# Patient Record
Sex: MALE | Race: BLACK OR AFRICAN AMERICAN | NOT HISPANIC OR LATINO | Employment: UNEMPLOYED | URBAN - METROPOLITAN AREA
[De-identification: names, ages, dates, MRNs, and addresses within clinical notes are randomized per-mention and may not be internally consistent; named-entity substitution may affect disease eponyms.]

---

## 2021-08-07 ENCOUNTER — HOSPITAL ENCOUNTER (EMERGENCY)
Facility: OTHER | Age: 61
Discharge: HOME | End: 2021-08-08
Attending: EMERGENCY MEDICINE
Payer: MEDICAID

## 2021-08-07 VITALS
OXYGEN SATURATION: 95 % | BODY MASS INDEX: 26.31 KG/M2 | DIASTOLIC BLOOD PRESSURE: 68 MMHG | HEIGHT: 74 IN | HEART RATE: 84 BPM | SYSTOLIC BLOOD PRESSURE: 135 MMHG | RESPIRATION RATE: 18 BRPM | TEMPERATURE: 98 F | WEIGHT: 205 LBS

## 2021-08-07 DIAGNOSIS — F10.920 ALCOHOLIC INTOXICATION WITHOUT COMPLICATION: ICD-10-CM
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
## 2021-08-07 DIAGNOSIS — T14.8XXA ABRASION: ICD-10-CM

## 2021-08-07 LAB — POCT GLUCOSE: 103 MG/DL (ref 70–110)

## 2021-08-07 PROCEDURE — 82962 GLUCOSE BLOOD TEST: CPT

## 2021-08-07 PROCEDURE — 99282 EMERGENCY DEPT VISIT SF MDM: CPT
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
## 2022-10-05 ENCOUNTER — OFFICE VISIT (OUTPATIENT)
Dept: PRIMARY CARE CLINIC | Facility: CLINIC | Age: 62
End: 2022-10-05
Payer: MEDICARE

## 2022-10-05 VITALS
OXYGEN SATURATION: 98 % | SYSTOLIC BLOOD PRESSURE: 162 MMHG | WEIGHT: 207.44 LBS | RESPIRATION RATE: 18 BRPM | TEMPERATURE: 98 F | HEIGHT: 74 IN | BODY MASS INDEX: 26.62 KG/M2 | DIASTOLIC BLOOD PRESSURE: 80 MMHG | HEART RATE: 69 BPM

## 2022-10-05 DIAGNOSIS — Z12.11 COLON CANCER SCREENING: ICD-10-CM

## 2022-10-05 DIAGNOSIS — R53.83 FATIGUE, UNSPECIFIED TYPE: ICD-10-CM

## 2022-10-05 DIAGNOSIS — Z00.00 ANNUAL PHYSICAL EXAM: ICD-10-CM

## 2022-10-05 DIAGNOSIS — Z12.5 PROSTATE CANCER SCREENING: Primary | ICD-10-CM

## 2022-10-05 DIAGNOSIS — E78.5 HYPERLIPIDEMIA, UNSPECIFIED HYPERLIPIDEMIA TYPE: ICD-10-CM

## 2022-10-05 DIAGNOSIS — R73.03 PREDIABETES: ICD-10-CM

## 2022-10-05 DIAGNOSIS — I10 HYPERTENSION, UNSPECIFIED TYPE: ICD-10-CM

## 2022-10-05 PROCEDURE — 3079F DIAST BP 80-89 MM HG: CPT | Mod: CPTII,S$GLB,, | Performed by: NURSE PRACTITIONER

## 2022-10-05 PROCEDURE — 1160F PR REVIEW ALL MEDS BY PRESCRIBER/CLIN PHARMACIST DOCUMENTED: ICD-10-PCS | Mod: CPTII,S$GLB,, | Performed by: NURSE PRACTITIONER

## 2022-10-05 PROCEDURE — 99999 PR PBB SHADOW E&M-EST. PATIENT-LVL IV: ICD-10-PCS | Mod: PBBFAC,,, | Performed by: NURSE PRACTITIONER

## 2022-10-05 PROCEDURE — 3077F PR MOST RECENT SYSTOLIC BLOOD PRESSURE >= 140 MM HG: ICD-10-PCS | Mod: CPTII,S$GLB,, | Performed by: NURSE PRACTITIONER

## 2022-10-05 PROCEDURE — 99999 PR PBB SHADOW E&M-EST. PATIENT-LVL IV: CPT | Mod: PBBFAC,,, | Performed by: NURSE PRACTITIONER

## 2022-10-05 PROCEDURE — 99204 PR OFFICE/OUTPT VISIT, NEW, LEVL IV, 45-59 MIN: ICD-10-PCS | Mod: S$GLB,,, | Performed by: NURSE PRACTITIONER

## 2022-10-05 PROCEDURE — 1160F RVW MEDS BY RX/DR IN RCRD: CPT | Mod: CPTII,S$GLB,, | Performed by: NURSE PRACTITIONER

## 2022-10-05 PROCEDURE — 99204 OFFICE O/P NEW MOD 45 MIN: CPT | Mod: S$GLB,,, | Performed by: NURSE PRACTITIONER

## 2022-10-05 PROCEDURE — 1159F PR MEDICATION LIST DOCUMENTED IN MEDICAL RECORD: ICD-10-PCS | Mod: CPTII,S$GLB,, | Performed by: NURSE PRACTITIONER

## 2022-10-05 PROCEDURE — 1159F MED LIST DOCD IN RCRD: CPT | Mod: CPTII,S$GLB,, | Performed by: NURSE PRACTITIONER

## 2022-10-05 PROCEDURE — 3008F PR BODY MASS INDEX (BMI) DOCUMENTED: ICD-10-PCS | Mod: CPTII,S$GLB,, | Performed by: NURSE PRACTITIONER

## 2022-10-05 PROCEDURE — 99499 UNLISTED E&M SERVICE: CPT | Mod: S$GLB,,, | Performed by: NURSE PRACTITIONER

## 2022-10-05 PROCEDURE — 99499 RISK ADDL DX/OHS AUDIT: ICD-10-PCS | Mod: S$GLB,,, | Performed by: NURSE PRACTITIONER

## 2022-10-05 PROCEDURE — 3079F PR MOST RECENT DIASTOLIC BLOOD PRESSURE 80-89 MM HG: ICD-10-PCS | Mod: CPTII,S$GLB,, | Performed by: NURSE PRACTITIONER

## 2022-10-05 PROCEDURE — 3077F SYST BP >= 140 MM HG: CPT | Mod: CPTII,S$GLB,, | Performed by: NURSE PRACTITIONER

## 2022-10-05 PROCEDURE — 3008F BODY MASS INDEX DOCD: CPT | Mod: CPTII,S$GLB,, | Performed by: NURSE PRACTITIONER

## 2022-10-05 RX ORDER — ADALIMUMAB 40MG/0.8ML
KIT SUBCUTANEOUS
COMMUNITY
Start: 2022-04-12 | End: 2023-01-20

## 2022-10-05 RX ORDER — HYDROCHLOROTHIAZIDE 12.5 MG/1
12.5 TABLET ORAL DAILY
Qty: 30 TABLET | Refills: 11 | Status: SHIPPED | OUTPATIENT
Start: 2022-10-05 | End: 2023-01-20

## 2022-10-11 ENCOUNTER — LAB VISIT (OUTPATIENT)
Dept: LAB | Facility: HOSPITAL | Age: 62
End: 2022-10-11
Attending: NURSE PRACTITIONER
Payer: MEDICARE

## 2022-10-11 DIAGNOSIS — Z12.5 PROSTATE CANCER SCREENING: ICD-10-CM

## 2022-10-11 DIAGNOSIS — R53.83 FATIGUE, UNSPECIFIED TYPE: ICD-10-CM

## 2022-10-11 DIAGNOSIS — E78.5 HYPERLIPIDEMIA, UNSPECIFIED HYPERLIPIDEMIA TYPE: ICD-10-CM

## 2022-10-11 DIAGNOSIS — R73.03 PREDIABETES: ICD-10-CM

## 2022-10-11 LAB
ALBUMIN SERPL BCP-MCNC: 4.3 G/DL (ref 3.5–5.2)
ALP SERPL-CCNC: 88 U/L (ref 55–135)
ALT SERPL W/O P-5'-P-CCNC: 24 U/L (ref 10–44)
ANION GAP SERPL CALC-SCNC: 11 MMOL/L (ref 8–16)
AST SERPL-CCNC: 29 U/L (ref 10–40)
BASOPHILS # BLD AUTO: 0.09 K/UL (ref 0–0.2)
BASOPHILS NFR BLD: 1.4 % (ref 0–1.9)
BILIRUB SERPL-MCNC: 1.2 MG/DL (ref 0.1–1)
BUN SERPL-MCNC: 7 MG/DL (ref 6–20)
CALCIUM SERPL-MCNC: 9.6 MG/DL (ref 8.7–10.5)
CHLORIDE SERPL-SCNC: 102 MMOL/L (ref 95–110)
CHOLEST SERPL-MCNC: 155 MG/DL (ref 120–199)
CHOLEST/HDLC SERPL: 4.2 {RATIO} (ref 2–5)
CO2 SERPL-SCNC: 27 MMOL/L (ref 23–29)
COMPLEXED PSA SERPL-MCNC: 1.7 NG/ML (ref 0–4)
CREAT SERPL-MCNC: 0.9 MG/DL (ref 0.5–1.4)
DIFFERENTIAL METHOD: ABNORMAL
EOSINOPHIL # BLD AUTO: 0.1 K/UL (ref 0–0.5)
EOSINOPHIL NFR BLD: 1.8 % (ref 0–8)
ERYTHROCYTE [DISTWIDTH] IN BLOOD BY AUTOMATED COUNT: 13 % (ref 11.5–14.5)
EST. GFR  (NO RACE VARIABLE): >60 ML/MIN/1.73 M^2
ESTIMATED AVG GLUCOSE: 108 MG/DL (ref 68–131)
GLUCOSE SERPL-MCNC: 85 MG/DL (ref 70–110)
HBA1C MFR BLD: 5.4 % (ref 4–5.6)
HCT VFR BLD AUTO: 37.5 % (ref 40–54)
HDLC SERPL-MCNC: 37 MG/DL (ref 40–75)
HDLC SERPL: 23.9 % (ref 20–50)
HGB BLD-MCNC: 12.9 G/DL (ref 14–18)
IMM GRANULOCYTES # BLD AUTO: 0.01 K/UL (ref 0–0.04)
IMM GRANULOCYTES NFR BLD AUTO: 0.2 % (ref 0–0.5)
LDLC SERPL CALC-MCNC: 94.4 MG/DL (ref 63–159)
LYMPHOCYTES # BLD AUTO: 1.7 K/UL (ref 1–4.8)
LYMPHOCYTES NFR BLD: 26.1 % (ref 18–48)
MCH RBC QN AUTO: 30.6 PG (ref 27–31)
MCHC RBC AUTO-ENTMCNC: 34.4 G/DL (ref 32–36)
MCV RBC AUTO: 89 FL (ref 82–98)
MONOCYTES # BLD AUTO: 0.5 K/UL (ref 0.3–1)
MONOCYTES NFR BLD: 8.2 % (ref 4–15)
NEUTROPHILS # BLD AUTO: 4.1 K/UL (ref 1.8–7.7)
NEUTROPHILS NFR BLD: 62.3 % (ref 38–73)
NONHDLC SERPL-MCNC: 118 MG/DL
NRBC BLD-RTO: 0 /100 WBC
PLATELET # BLD AUTO: 227 K/UL (ref 150–450)
PMV BLD AUTO: 10.4 FL (ref 9.2–12.9)
POTASSIUM SERPL-SCNC: 4 MMOL/L (ref 3.5–5.1)
PROT SERPL-MCNC: 8.2 G/DL (ref 6–8.4)
RBC # BLD AUTO: 4.22 M/UL (ref 4.6–6.2)
SODIUM SERPL-SCNC: 140 MMOL/L (ref 136–145)
T4 FREE SERPL-MCNC: 0.99 NG/DL (ref 0.71–1.51)
TRIGL SERPL-MCNC: 118 MG/DL (ref 30–150)
TSH SERPL DL<=0.005 MIU/L-ACNC: 1.06 UIU/ML (ref 0.4–4)
WBC # BLD AUTO: 6.62 K/UL (ref 3.9–12.7)

## 2022-10-11 PROCEDURE — 36415 COLL VENOUS BLD VENIPUNCTURE: CPT | Mod: PN | Performed by: NURSE PRACTITIONER

## 2022-10-11 PROCEDURE — 85025 COMPLETE CBC W/AUTO DIFF WBC: CPT | Performed by: NURSE PRACTITIONER

## 2022-10-11 PROCEDURE — 80053 COMPREHEN METABOLIC PANEL: CPT | Performed by: NURSE PRACTITIONER

## 2022-10-11 PROCEDURE — 80061 LIPID PANEL: CPT | Performed by: NURSE PRACTITIONER

## 2022-10-11 PROCEDURE — 84439 ASSAY OF FREE THYROXINE: CPT | Performed by: NURSE PRACTITIONER

## 2022-10-11 PROCEDURE — 83036 HEMOGLOBIN GLYCOSYLATED A1C: CPT | Performed by: NURSE PRACTITIONER

## 2022-10-11 PROCEDURE — 84443 ASSAY THYROID STIM HORMONE: CPT | Performed by: NURSE PRACTITIONER

## 2022-10-11 PROCEDURE — 84153 ASSAY OF PSA TOTAL: CPT | Performed by: NURSE PRACTITIONER

## 2023-01-20 ENCOUNTER — OFFICE VISIT (OUTPATIENT)
Dept: PRIMARY CARE CLINIC | Facility: CLINIC | Age: 63
End: 2023-01-20
Payer: MEDICARE

## 2023-01-20 VITALS
TEMPERATURE: 98 F | BODY MASS INDEX: 27.73 KG/M2 | SYSTOLIC BLOOD PRESSURE: 148 MMHG | HEART RATE: 76 BPM | HEIGHT: 74 IN | WEIGHT: 216.06 LBS | OXYGEN SATURATION: 98 % | DIASTOLIC BLOOD PRESSURE: 82 MMHG

## 2023-01-20 DIAGNOSIS — Z91.89 FRAMINGHAM CARDIAC RISK >20% IN NEXT 10 YEARS: ICD-10-CM

## 2023-01-20 DIAGNOSIS — D57.3 SICKLE CELL TRAIT: ICD-10-CM

## 2023-01-20 DIAGNOSIS — Z12.11 COLON CANCER SCREENING: ICD-10-CM

## 2023-01-20 DIAGNOSIS — I10 PRIMARY HYPERTENSION: Primary | ICD-10-CM

## 2023-01-20 DIAGNOSIS — F17.200 SMOKER: ICD-10-CM

## 2023-01-20 PROBLEM — Z79.899 DRUG-INDUCED IMMUNODEFICIENCY: Status: RESOLVED | Noted: 2023-01-20 | Resolved: 2023-01-20

## 2023-01-20 PROBLEM — Z79.899 DRUG-INDUCED IMMUNODEFICIENCY: Status: ACTIVE | Noted: 2023-01-20

## 2023-01-20 PROBLEM — F10.920 ALCOHOLIC INTOXICATION WITHOUT COMPLICATION: Status: RESOLVED | Noted: 2023-01-20 | Resolved: 2023-01-20

## 2023-01-20 PROBLEM — D84.821 DRUG-INDUCED IMMUNODEFICIENCY: Status: ACTIVE | Noted: 2023-01-20

## 2023-01-20 PROBLEM — D84.821 DRUG-INDUCED IMMUNODEFICIENCY: Status: RESOLVED | Noted: 2023-01-20 | Resolved: 2023-01-20

## 2023-01-20 PROBLEM — F10.920 ALCOHOLIC INTOXICATION WITHOUT COMPLICATION: Status: ACTIVE | Noted: 2023-01-20

## 2023-01-20 PROCEDURE — 1160F RVW MEDS BY RX/DR IN RCRD: CPT | Mod: CPTII,S$GLB,, | Performed by: FAMILY MEDICINE

## 2023-01-20 PROCEDURE — 99215 OFFICE O/P EST HI 40 MIN: CPT | Mod: S$GLB,,, | Performed by: FAMILY MEDICINE

## 2023-01-20 PROCEDURE — 3077F SYST BP >= 140 MM HG: CPT | Mod: CPTII,S$GLB,, | Performed by: FAMILY MEDICINE

## 2023-01-20 PROCEDURE — 3008F BODY MASS INDEX DOCD: CPT | Mod: CPTII,S$GLB,, | Performed by: FAMILY MEDICINE

## 2023-01-20 PROCEDURE — 3008F PR BODY MASS INDEX (BMI) DOCUMENTED: ICD-10-PCS | Mod: CPTII,S$GLB,, | Performed by: FAMILY MEDICINE

## 2023-01-20 PROCEDURE — 1159F MED LIST DOCD IN RCRD: CPT | Mod: CPTII,S$GLB,, | Performed by: FAMILY MEDICINE

## 2023-01-20 PROCEDURE — 3077F PR MOST RECENT SYSTOLIC BLOOD PRESSURE >= 140 MM HG: ICD-10-PCS | Mod: CPTII,S$GLB,, | Performed by: FAMILY MEDICINE

## 2023-01-20 PROCEDURE — 99999 PR PBB SHADOW E&M-EST. PATIENT-LVL V: ICD-10-PCS | Mod: PBBFAC,,, | Performed by: FAMILY MEDICINE

## 2023-01-20 PROCEDURE — 1159F PR MEDICATION LIST DOCUMENTED IN MEDICAL RECORD: ICD-10-PCS | Mod: CPTII,S$GLB,, | Performed by: FAMILY MEDICINE

## 2023-01-20 PROCEDURE — 3079F DIAST BP 80-89 MM HG: CPT | Mod: CPTII,S$GLB,, | Performed by: FAMILY MEDICINE

## 2023-01-20 PROCEDURE — 1160F PR REVIEW ALL MEDS BY PRESCRIBER/CLIN PHARMACIST DOCUMENTED: ICD-10-PCS | Mod: CPTII,S$GLB,, | Performed by: FAMILY MEDICINE

## 2023-01-20 PROCEDURE — 3079F PR MOST RECENT DIASTOLIC BLOOD PRESSURE 80-89 MM HG: ICD-10-PCS | Mod: CPTII,S$GLB,, | Performed by: FAMILY MEDICINE

## 2023-01-20 PROCEDURE — 99999 PR PBB SHADOW E&M-EST. PATIENT-LVL V: CPT | Mod: PBBFAC,,, | Performed by: FAMILY MEDICINE

## 2023-01-20 PROCEDURE — 99215 PR OFFICE/OUTPT VISIT, EST, LEVL V, 40-54 MIN: ICD-10-PCS | Mod: S$GLB,,, | Performed by: FAMILY MEDICINE

## 2023-01-20 RX ORDER — VALSARTAN AND HYDROCHLOROTHIAZIDE 160; 12.5 MG/1; MG/1
1 TABLET, FILM COATED ORAL DAILY
Qty: 30 TABLET | Refills: 0 | Status: SHIPPED | OUTPATIENT
Start: 2023-01-20 | End: 2023-02-17 | Stop reason: SDUPTHER

## 2023-01-20 RX ORDER — TADALAFIL 20 MG/1
20 TABLET ORAL DAILY
Qty: 10 TABLET | Refills: 1 | Status: SHIPPED | OUTPATIENT
Start: 2023-01-20 | End: 2023-02-17 | Stop reason: SDUPTHER

## 2023-01-20 RX ORDER — ROSUVASTATIN CALCIUM 20 MG/1
20 TABLET, COATED ORAL DAILY
Qty: 30 TABLET | Refills: 1 | Status: SHIPPED | OUTPATIENT
Start: 2023-01-20 | End: 2023-02-17 | Stop reason: SDUPTHER

## 2023-01-20 RX ORDER — ASPIRIN 81 MG/1
81 TABLET ORAL DAILY
COMMUNITY
End: 2023-02-17 | Stop reason: SDUPTHER

## 2023-01-20 NOTE — PROGRESS NOTES
"    BP (!) 148/82 (BP Location: Right arm, Patient Position: Sitting, BP Method: Large (Manual))   Pulse 76   Temp 97.9 °F (36.6 °C) (Oral)   Ht 6' 2" (1.88 m)   Wt 98 kg (216 lb 0.8 oz)   SpO2 98%   BMI 27.74 kg/m²       ===========    Chief Complaint:  Annual exam        Gilson Tan is a 62 y.o. male here for    HPI    Annual Exam.  Health maintenance reviewed with pt in detail inc screening studies such as lab studies and vaccines    Patient queried and denies any further complaints    SURGICAL AND MEDICAL HISTORY: updated and reviewed.  ALLERGIES updated and reviewed.  Review of patient's allergies indicates:  No Known Allergies  CURRENT OUTPATIENT MEDICATIONS updated and reviewed    Current Outpatient Medications:     aspirin (ECOTRIN) 81 MG EC tablet, Take 81 mg by mouth once daily., Disp: , Rfl:     pneumoc 20-stevo conj-dip cr,PF, (PREVNAR 20, PF,) 0.5 mL Syrg injection, Inject into the muscle., Disp: 0.5 mL, Rfl: 0    rosuvastatin (CRESTOR) 20 MG tablet, Take 1 tablet (20 mg total) by mouth once daily., Disp: 30 tablet, Rfl: 1    tadalafiL (CIALIS) 20 MG Tab, Take 1 tablet (20 mg total) by mouth once daily., Disp: 10 tablet, Rfl: 1    valsartan-hydrochlorothiazide (DIOVAN-HCT) 160-12.5 mg per tablet, Take 1 tablet by mouth once daily in the morning for high blood pressure, Disp: 30 tablet, Rfl: 0    Review of Systems   Constitutional:  Negative for activity change, appetite change, chills, diaphoresis, fatigue, fever and unexpected weight change.   HENT:  Negative for congestion, ear discharge, ear pain, facial swelling, hearing loss, nosebleeds, postnasal drip, rhinorrhea, sinus pressure, sneezing, sore throat, tinnitus, trouble swallowing and voice change.    Eyes:  Negative for photophobia, pain, discharge, redness, itching and visual disturbance.   Respiratory:  Negative for cough, chest tightness, shortness of breath and wheezing.    Cardiovascular:  Negative for chest pain, palpitations and leg " "swelling.   Gastrointestinal:  Negative for abdominal distention, abdominal pain, anal bleeding, blood in stool, constipation, diarrhea, nausea, rectal pain and vomiting.   Endocrine: Negative for cold intolerance, heat intolerance, polydipsia, polyphagia and polyuria.   Genitourinary:  Negative for difficulty urinating, dysuria and flank pain.   Musculoskeletal:  Negative for arthralgias, back pain, joint swelling, myalgias and neck pain.   Skin:  Negative for rash.   Neurological:  Negative for dizziness, tremors, seizures, syncope, speech difficulty, weakness, light-headedness, numbness and headaches.   Psychiatric/Behavioral:  Negative for behavioral problems, confusion, decreased concentration, dysphoric mood, sleep disturbance and suicidal ideas. The patient is not nervous/anxious and is not hyperactive.      BP (!) 148/82 (BP Location: Right arm, Patient Position: Sitting, BP Method: Large (Manual))   Pulse 76   Temp 97.9 °F (36.6 °C) (Oral)   Ht 6' 2" (1.88 m)   Wt 98 kg (216 lb 0.8 oz)   SpO2 98%   BMI 27.74 kg/m²   Physical Exam  Vitals and nursing note reviewed.   Constitutional:       General: He is not in acute distress.     Appearance: Normal appearance. He is well-developed. He is not ill-appearing or toxic-appearing.   HENT:      Head: Normocephalic and atraumatic.      Right Ear: Tympanic membrane, ear canal and external ear normal.      Left Ear: Tympanic membrane, ear canal and external ear normal.      Nose: Nose normal.      Mouth/Throat:      Lips: Pink.      Mouth: Mucous membranes are moist.      Pharynx: No oropharyngeal exudate or posterior oropharyngeal erythema.   Eyes:      General: No scleral icterus.        Right eye: No discharge.         Left eye: No discharge.      Extraocular Movements: Extraocular movements intact.      Conjunctiva/sclera: Conjunctivae normal.   Cardiovascular:      Rate and Rhythm: Normal rate and regular rhythm.      Pulses: Normal pulses.      Heart " sounds: Normal heart sounds. No murmur heard.  Pulmonary:      Effort: Pulmonary effort is normal. No respiratory distress.      Breath sounds: Normal breath sounds. No wheezing or rales.   Abdominal:      General: Bowel sounds are normal. There is no distension.      Palpations: Abdomen is soft. There is no mass.      Tenderness: There is no abdominal tenderness. There is no right CVA tenderness, left CVA tenderness, guarding or rebound.      Hernia: No hernia is present.   Musculoskeletal:      Cervical back: Normal range of motion and neck supple. No rigidity or tenderness.   Lymphadenopathy:      Cervical: No cervical adenopathy.   Skin:     General: Skin is warm and dry.   Neurological:      General: No focal deficit present.      Mental Status: He is alert. Mental status is at baseline.   Psychiatric:         Mood and Affect: Mood normal.         Behavior: Behavior normal. Behavior is cooperative.       Gilson was seen today for establish care.    Diagnoses and all orders for this visit:    Primary hypertension  -     Ambulatory referral/consult to Cardiology; Future    [place] cardiac risk >20% in next 10 years  -     Ambulatory referral/consult to Cardiology; Future    Sickle cell trait    Smoker  -     Ambulatory referral/consult to Cardiology; Future  -     Ambulatory referral/consult to Smoking Cessation Program; Future    Colon cancer screening  -     Ambulatory referral/consult to Endo Procedure ; Future    Other orders  -     valsartan-hydrochlorothiazide (DIOVAN-HCT) 160-12.5 mg per tablet; Take 1 tablet by mouth once daily in the morning for high blood pressure  -     rosuvastatin (CRESTOR) 20 MG tablet; Take 1 tablet (20 mg total) by mouth once daily.  -     tadalafiL (CIALIS) 20 MG Tab; Take 1 tablet (20 mg total) by mouth once daily.      The 10-year ASCVD risk score (Estella MARQUEZ, et al., 2019) is: 28.4%    Values used to calculate the score:      Age: 62 years      Sex: Male      Is  Non- : Yes      Diabetic: No      Tobacco smoker: Yes      Systolic Blood Pressure: 148 mmHg      Is BP treated: Yes      HDL Cholesterol: 37 mg/dL      Total Cholesterol: 155 mg/dL    Reports that eating butter this mornin must have pushed up by blood pressure.   Educated him that it is much more complicated than that.  Educated on low-sodium, low-fat, low-carbohydrate diet and exercise     Follow-up in 2 weeks regarding blood pressure after starting medicine as above       Reviewed old pertinent medical records available.     All lab results personally reviewed and interpreted by me including last year, if available.    Established. Time spent in the evaluation and management of this patient exceeded  40 min and greater than 50% of this time was in face-to-face time with the patient.    Total time including the following:  -preparing to see the patient (e.g., obtaining and/or reviewing old records such as old primary care notes, specialists notes, hospital notes, review of laboratory tests, radiographic and/or cardiology studies)  -orders which can include medications, laboratory studies, radiographic studies, procedures, referrals etcetera   --communicating with the patient and/or family member/caregiver regarding a detailed explanation of the treatment/care plan  -arranging possible referrals and follow-up plan  -documentation of the visit in the electronic health record      Raleigh Archibald MD

## 2023-01-30 ENCOUNTER — CLINICAL SUPPORT (OUTPATIENT)
Dept: SMOKING CESSATION | Facility: CLINIC | Age: 63
End: 2023-01-30
Payer: COMMERCIAL

## 2023-01-30 DIAGNOSIS — F17.200 NICOTINE DEPENDENCE: Primary | ICD-10-CM

## 2023-01-30 PROCEDURE — 99404 PR PREVENT COUNSEL,INDIV,60 MIN: ICD-10-PCS | Mod: S$GLB,,,

## 2023-01-30 PROCEDURE — 99404 PREV MED CNSL INDIV APPRX 60: CPT | Mod: S$GLB,,,

## 2023-01-30 RX ORDER — MICONAZOLE NITRATE 2 %
2 CREAM (GRAM) TOPICAL
Qty: 100 EACH | Refills: 0 | Status: SHIPPED | OUTPATIENT
Start: 2023-01-30 | End: 2023-03-15 | Stop reason: SDDI

## 2023-01-30 RX ORDER — NICOTINE 7MG/24HR
1 PATCH, TRANSDERMAL 24 HOURS TRANSDERMAL DAILY
Qty: 14 PATCH | Refills: 0 | Status: SHIPPED | OUTPATIENT
Start: 2023-01-30 | End: 2023-03-15 | Stop reason: SDDI

## 2023-01-30 NOTE — PROGRESS NOTES
Patient was seen in clinic today for intake.  Patient states that he smokes 5 cigarettes  on some days.  Patient's CO monitor was 4 ppm .  Patient will begin the prescribed tobacco cessation medication regimen of  7 mg nicotine patch and 2 mg nicotine gum. Completion of TCRS (Tobacco Cessation Rating Scale) reviewed learned addiction, model, personal reasons for quitting, medications, goals and quit date.    Prescribed medication management will be by physician.  Patient will continue with sessions and medication monitoring by CTTS.

## 2023-02-09 ENCOUNTER — TELEPHONE (OUTPATIENT)
Dept: PRIMARY CARE CLINIC | Facility: CLINIC | Age: 63
End: 2023-02-09
Payer: MEDICARE

## 2023-02-09 NOTE — TELEPHONE ENCOUNTER
----- Message from Meg Woods sent at 2/9/2023 10:02 AM CST -----  Contact: Gilson   [phone]  Patient is requesting a call back. He wants to reschedule tomorrow's appt. I tried to reschedule Patient but couldn't find any appts available.

## 2023-02-13 ENCOUNTER — TELEPHONE (OUTPATIENT)
Dept: SMOKING CESSATION | Facility: CLINIC | Age: 63
End: 2023-02-13
Payer: MEDICARE

## 2023-02-13 NOTE — TELEPHONE ENCOUNTER
[facility]-called patient regarding missed follow up appointment, no answer.  Left message and contact number was given.

## 2023-02-16 ENCOUNTER — TELEPHONE (OUTPATIENT)
Dept: SMOKING CESSATION | Facility: CLINIC | Age: 63
End: 2023-02-16
Payer: MEDICARE

## 2023-02-16 NOTE — TELEPHONE ENCOUNTER
[facility]-called patient regarding missed follow up appointment. Patient is scheduled on 02- at 10:00 AM.

## 2023-02-17 ENCOUNTER — OFFICE VISIT (OUTPATIENT)
Dept: PRIMARY CARE CLINIC | Facility: CLINIC | Age: 63
End: 2023-02-17
Payer: MEDICARE

## 2023-02-17 ENCOUNTER — TELEPHONE (OUTPATIENT)
Dept: PRIMARY CARE CLINIC | Facility: CLINIC | Age: 63
End: 2023-02-17
Payer: MEDICARE

## 2023-02-17 VITALS
HEART RATE: 84 BPM | DIASTOLIC BLOOD PRESSURE: 66 MMHG | BODY MASS INDEX: 27.39 KG/M2 | OXYGEN SATURATION: 97 % | SYSTOLIC BLOOD PRESSURE: 102 MMHG | HEIGHT: 74 IN | WEIGHT: 213.38 LBS | TEMPERATURE: 99 F

## 2023-02-17 DIAGNOSIS — I10 PRIMARY HYPERTENSION: Primary | ICD-10-CM

## 2023-02-17 DIAGNOSIS — Z91.89 FRAMINGHAM CARDIAC RISK >20% IN NEXT 10 YEARS: ICD-10-CM

## 2023-02-17 PROCEDURE — 3078F PR MOST RECENT DIASTOLIC BLOOD PRESSURE < 80 MM HG: ICD-10-PCS | Mod: CPTII,S$GLB,, | Performed by: FAMILY MEDICINE

## 2023-02-17 PROCEDURE — 3008F BODY MASS INDEX DOCD: CPT | Mod: CPTII,S$GLB,, | Performed by: FAMILY MEDICINE

## 2023-02-17 PROCEDURE — 99214 PR OFFICE/OUTPT VISIT, EST, LEVL IV, 30-39 MIN: ICD-10-PCS | Mod: S$GLB,,, | Performed by: FAMILY MEDICINE

## 2023-02-17 PROCEDURE — 1159F PR MEDICATION LIST DOCUMENTED IN MEDICAL RECORD: ICD-10-PCS | Mod: CPTII,S$GLB,, | Performed by: FAMILY MEDICINE

## 2023-02-17 PROCEDURE — 3074F SYST BP LT 130 MM HG: CPT | Mod: CPTII,S$GLB,, | Performed by: FAMILY MEDICINE

## 2023-02-17 PROCEDURE — 3008F PR BODY MASS INDEX (BMI) DOCUMENTED: ICD-10-PCS | Mod: CPTII,S$GLB,, | Performed by: FAMILY MEDICINE

## 2023-02-17 PROCEDURE — 99999 PR PBB SHADOW E&M-EST. PATIENT-LVL III: CPT | Mod: PBBFAC,,, | Performed by: FAMILY MEDICINE

## 2023-02-17 PROCEDURE — 3078F DIAST BP <80 MM HG: CPT | Mod: CPTII,S$GLB,, | Performed by: FAMILY MEDICINE

## 2023-02-17 PROCEDURE — 1160F RVW MEDS BY RX/DR IN RCRD: CPT | Mod: CPTII,S$GLB,, | Performed by: FAMILY MEDICINE

## 2023-02-17 PROCEDURE — 3074F PR MOST RECENT SYSTOLIC BLOOD PRESSURE < 130 MM HG: ICD-10-PCS | Mod: CPTII,S$GLB,, | Performed by: FAMILY MEDICINE

## 2023-02-17 PROCEDURE — 1160F PR REVIEW ALL MEDS BY PRESCRIBER/CLIN PHARMACIST DOCUMENTED: ICD-10-PCS | Mod: CPTII,S$GLB,, | Performed by: FAMILY MEDICINE

## 2023-02-17 PROCEDURE — 99214 OFFICE O/P EST MOD 30 MIN: CPT | Mod: S$GLB,,, | Performed by: FAMILY MEDICINE

## 2023-02-17 PROCEDURE — 1159F MED LIST DOCD IN RCRD: CPT | Mod: CPTII,S$GLB,, | Performed by: FAMILY MEDICINE

## 2023-02-17 PROCEDURE — 99999 PR PBB SHADOW E&M-EST. PATIENT-LVL III: ICD-10-PCS | Mod: PBBFAC,,, | Performed by: FAMILY MEDICINE

## 2023-02-17 RX ORDER — VALSARTAN AND HYDROCHLOROTHIAZIDE 160; 12.5 MG/1; MG/1
1 TABLET, FILM COATED ORAL DAILY
Qty: 90 TABLET | Refills: 3 | Status: SHIPPED | OUTPATIENT
Start: 2023-02-17 | End: 2024-02-08

## 2023-02-17 RX ORDER — TADALAFIL 20 MG/1
20 TABLET ORAL
Qty: 10 TABLET | Refills: 11 | Status: SHIPPED | OUTPATIENT
Start: 2023-02-17 | End: 2024-02-17

## 2023-02-17 RX ORDER — ROSUVASTATIN CALCIUM 20 MG/1
20 TABLET, COATED ORAL DAILY
Qty: 90 TABLET | Refills: 3 | Status: SHIPPED | OUTPATIENT
Start: 2023-02-17 | End: 2024-02-08

## 2023-02-17 RX ORDER — ASPIRIN 81 MG/1
81 TABLET ORAL DAILY
Qty: 90 TABLET | Refills: 3 | Status: SHIPPED | OUTPATIENT
Start: 2023-02-17

## 2023-02-17 NOTE — PROGRESS NOTES
"    /66 (BP Location: Left arm, Patient Position: Sitting)   Pulse 84   Temp 98.9 °F (37.2 °C)   Ht 6' 2" (1.88 m)   Wt 96.8 kg (213 lb 6.5 oz)   SpO2 97%   BMI 27.40 kg/m²       ===========    Chief Complaint: No chief complaint on file.        Gilson Tan is a 62 y.o. male here for    HPI    Follow-up of blood pressure.  Tolerating meds well.  Asymptomatic    Patient queried and denies any further complaints    SURGICAL AND MEDICAL HISTORY: updated and reviewed.  ALLERGIES updated and reviewed.  Review of patient's allergies indicates:  No Known Allergies  CURRENT OUTPATIENT MEDICATIONS updated and reviewed    Current Outpatient Medications:     pneumoc 20-stevo conj-dip cr,PF, (PREVNAR 20, PF,) 0.5 mL Syrg injection, Inject into the muscle., Disp: 0.5 mL, Rfl: 0    aspirin (ECOTRIN) 81 MG EC tablet, Take 1 tablet (81 mg total) by mouth once daily., Disp: 90 tablet, Rfl: 3    diphth,pertus,acell,,tetanus (BOOSTRIX TDAP) 2.5-8-5 Lf-mcg-Lf/0.5mL Syrg injection, Inject into the muscle., Disp: 0.5 mL, Rfl: 0    nicotine (NICODERM CQ) 7 mg/24 hr, Place 1 patch onto the skin once daily. Change location daily., Disp: 14 patch, Rfl: 0    nicotine, polacrilex, (NICORETTE) 2 mg Gum, Take 1 each (2 mg total) by mouth as needed (Use in place of cigarettes, up to 6 per day.)., Disp: 100 each, Rfl: 0    rosuvastatin (CRESTOR) 20 MG tablet, Take 1 tablet (20 mg total) by mouth once daily., Disp: 90 tablet, Rfl: 3    tadalafiL (CIALIS) 20 MG Tab, Take 1 tablet (20 mg total) by mouth every 36 (thirty-six) hours. As needed for erectile dysfunction, Disp: 10 tablet, Rfl: 11    valsartan-hydrochlorothiazide (DIOVAN-HCT) 160-12.5 mg per tablet, Take 1 tablet by mouth once daily in the morning for high blood pressure, Disp: 90 tablet, Rfl: 3    Review of Systems   Constitutional:  Negative for activity change, appetite change, chills, diaphoresis, fatigue, fever and unexpected weight change.   HENT:  Negative for congestion, " "ear discharge, ear pain, facial swelling, hearing loss, nosebleeds, postnasal drip, rhinorrhea, sinus pressure, sneezing, sore throat, tinnitus, trouble swallowing and voice change.    Eyes:  Negative for photophobia, pain, discharge, redness, itching and visual disturbance.   Respiratory:  Negative for cough, chest tightness, shortness of breath and wheezing.    Cardiovascular:  Negative for chest pain, palpitations and leg swelling.   Gastrointestinal:  Negative for abdominal distention, abdominal pain, anal bleeding, blood in stool, constipation, diarrhea, nausea, rectal pain and vomiting.   Endocrine: Negative for cold intolerance, heat intolerance, polydipsia, polyphagia and polyuria.   Genitourinary:  Negative for difficulty urinating, dysuria and flank pain.   Musculoskeletal:  Negative for arthralgias, back pain, joint swelling, myalgias and neck pain.   Skin:  Negative for rash.   Neurological:  Negative for dizziness, tremors, seizures, syncope, speech difficulty, weakness, light-headedness, numbness and headaches.   Psychiatric/Behavioral:  Negative for behavioral problems, confusion, decreased concentration, dysphoric mood, sleep disturbance and suicidal ideas. The patient is not nervous/anxious and is not hyperactive.      /66 (BP Location: Left arm, Patient Position: Sitting)   Pulse 84   Temp 98.9 °F (37.2 °C)   Ht 6' 2" (1.88 m)   Wt 96.8 kg (213 lb 6.5 oz)   SpO2 97%   BMI 27.40 kg/m²   Physical Exam  Vitals and nursing note reviewed.   Constitutional:       General: He is not in acute distress.     Appearance: Normal appearance. He is not ill-appearing, toxic-appearing or diaphoretic.   HENT:      Head: Normocephalic and atraumatic.   Eyes:      General: No scleral icterus.        Right eye: No discharge.         Left eye: No discharge.      Extraocular Movements: Extraocular movements intact.      Conjunctiva/sclera: Conjunctivae normal.   Skin:     General: Skin is warm and dry. "   Neurological:      Mental Status: He is alert.   Psychiatric:         Mood and Affect: Mood normal.         Behavior: Behavior normal.       Diagnoses and all orders for this visit:    Primary hypertension    [place] cardiac risk >20% in next 10 years    Other orders  -     aspirin (ECOTRIN) 81 MG EC tablet; Take 1 tablet (81 mg total) by mouth once daily.  -     rosuvastatin (CRESTOR) 20 MG tablet; Take 1 tablet (20 mg total) by mouth once daily.  -     valsartan-hydrochlorothiazide (DIOVAN-HCT) 160-12.5 mg per tablet; Take 1 tablet by mouth once daily in the morning for high blood pressure  -     tadalafiL (CIALIS) 20 MG Tab; Take 1 tablet (20 mg total) by mouth every 36 (thirty-six) hours. As needed for erectile dysfunction        The 10-year ASCVD risk score (Estella MARQUEZ, et al., 2019) is: 15.1%    Values used to calculate the score:      Age: 62 years      Sex: Male      Is Non- : Yes      Diabetic: No      Tobacco smoker: Yes      Systolic Blood Pressure: 102 mmHg      Is BP treated: Yes      HDL Cholesterol: 37 mg/dL      Total Cholesterol: 155 mg/dL    Recommend smoking cessation   Continue blood pressure med as above.    Follow-up with PCP in 3-6 months.    Discussed possible statin therapy today given his [place] score.  He will defer for now and discuss with Cardiology.    All lab results over past 2 years available reviewed inc labs and any cardiology or radiology studies  Any new prescription medications gone over in detail including reason for taking the medication, the general mechanism of action, most common possible side effects and possible costs, etcetera.  Raleigh Archibald MD    Est  Time spent in the evaluation and management of this patient exceeded 30min and greater than 50% of this time was in face-to-face time with the patient.    Total time including the following:  -preparing to see the patient (e.g., obtaining and/or reviewing old records such as old primary  care notes, specialists notes, hospital notes, review of laboratory tests, radiographic and/or cardiology studies)  -orders which can include medications, laboratory studies, radiographic studies, procedures, referrals etcetera   --communicating with the patient and/or family member/caregiver regarding a detailed explanation of the treatment/care plan  -arranging possible referrals and follow-up plan  -documentation of the visit in the electronic health record

## 2023-02-27 ENCOUNTER — TELEPHONE (OUTPATIENT)
Dept: SMOKING CESSATION | Facility: CLINIC | Age: 63
End: 2023-02-27
Payer: MEDICARE

## 2023-02-27 NOTE — TELEPHONE ENCOUNTER
[facility]-called patient regarding missed follow up appointment.  Patient stated he is busy at this moment.  Patient is rescheduled on 03- at 9:00 am.

## 2023-03-02 ENCOUNTER — TELEPHONE (OUTPATIENT)
Dept: SMOKING CESSATION | Facility: CLINIC | Age: 63
End: 2023-03-02
Payer: MEDICARE

## 2023-04-06 ENCOUNTER — TELEPHONE (OUTPATIENT)
Dept: ENDOSCOPY | Facility: HOSPITAL | Age: 63
End: 2023-04-06

## 2023-04-06 NOTE — TELEPHONE ENCOUNTER
Attempted to reach pt x 2 for his scheduled PAT appointment but he was not available. Letter sent out and VM left for pt to contact the Endoscopy Scheduling Dept at # [phone] to get his procedure scheduled.

## 2023-04-17 ENCOUNTER — TELEPHONE (OUTPATIENT)
Dept: ENDOSCOPY | Facility: HOSPITAL | Age: 63
End: 2023-04-17

## 2023-04-17 DIAGNOSIS — Z12.11 SPECIAL SCREENING FOR MALIGNANT NEOPLASMS, COLON: Primary | ICD-10-CM

## 2023-04-17 RX ORDER — POLYETHYLENE GLYCOL 3350, SODIUM SULFATE ANHYDROUS, SODIUM BICARBONATE, SODIUM CHLORIDE, POTASSIUM CHLORIDE 236; 22.74; 6.74; 5.86; 2.97 G/4L; G/4L; G/4L; G/4L; G/4L
4000 POWDER, FOR SOLUTION ORAL ONCE
Status: DISCONTINUED | OUTPATIENT
Start: 2023-04-17 | End: 2023-04-17 | Stop reason: HOSPADM

## 2023-05-18 ENCOUNTER — TELEPHONE (OUTPATIENT)
Dept: ENDOSCOPY | Facility: HOSPITAL | Age: 63
End: 2023-05-18
Payer: MEDICARE

## 2023-05-18 NOTE — TELEPHONE ENCOUNTER
[facility] stated had instructions. Verbalized prep instructions in detail.emphasized otc dulcolax tabs and clear liquid diet.

## 2023-07-25 ENCOUNTER — CLINICAL SUPPORT (OUTPATIENT)
Dept: SMOKING CESSATION | Facility: CLINIC | Age: 63
End: 2023-07-25

## 2023-07-25 DIAGNOSIS — F17.200 NICOTINE DEPENDENCE: Primary | ICD-10-CM

## 2023-07-25 PROCEDURE — 99999 PR PBB SHADOW E&M-EST. PATIENT-LVL I: CPT | Mod: PBBFAC,,,

## 2023-07-25 PROCEDURE — 99407 BEHAV CHNG SMOKING > 10 MIN: CPT | Mod: S$GLB,,, | Performed by: DIETITIAN, REGISTERED

## 2023-07-25 PROCEDURE — 99999 PR PBB SHADOW E&M-EST. PATIENT-LVL I: ICD-10-PCS | Mod: PBBFAC,,,

## 2023-07-25 PROCEDURE — 99407 PR TOBACCO USE CESSATION INTENSIVE >10 MINUTES: ICD-10-PCS | Mod: S$GLB,,, | Performed by: DIETITIAN, REGISTERED

## 2023-07-25 NOTE — PROGRESS NOTES
"  Spoke with patient today in regard to smoking cessation progress for 6 month telephone follow up, he states he is not tobacco free. Pt shares he continues to smoke 0-3 cigarettes per day. Pt also shares he was wearing patches until he ran out. He would like to be re-scheduled for a follow up with [facility] "sometime next month at 9am". Informed patient of benefit period, future follow ups, and contact information if any further help or support is needed. Will complete smart form for 6 month follow up for Quit attempt #1.   "

## 2023-08-08 ENCOUNTER — TELEPHONE (OUTPATIENT)
Dept: SMOKING CESSATION | Facility: CLINIC | Age: 63
End: 2023-08-08
Payer: MEDICARE

## 2023-08-08 NOTE — TELEPHONE ENCOUNTER
[facility]-called patient regarding missed intake appointment, no answer.  Left message and contact numbers were  given.

## 2023-08-14 ENCOUNTER — TELEPHONE (OUTPATIENT)
Dept: SMOKING CESSATION | Facility: CLINIC | Age: 63
End: 2023-08-14
Payer: MEDICARE

## 2023-08-24 ENCOUNTER — TELEPHONE (OUTPATIENT)
Dept: DERMATOLOGY | Facility: CLINIC | Age: 63
End: 2023-08-24
Payer: MEDICARE

## 2023-08-24 NOTE — TELEPHONE ENCOUNTER
Scheduled pt for our soonest available appt.     ----- Message from Charley Forbes LPN sent at 8/22/2023  8:58 PM CDT -----  Regarding: FW: appt access  Contact: pt [phone]    ----- Message -----  From: Barbra Raza  Sent: 8/22/2023   9:31 AM CDT  To: [facility] Clinical Support Staff  Subject: appt access                                      PATIENT CALL    Pt is calling to speak with someone in provider's office regarding establishing care w/ an [facility] doctor. Hx of psoriasis and states that he's dissatisfied w/ his previous provider and would like a call back. Please call pt at [phone].

## 2023-09-08 ENCOUNTER — OFFICE VISIT (OUTPATIENT)
Dept: DERMATOLOGY | Facility: CLINIC | Age: 63
End: 2023-09-08
Payer: MEDICARE

## 2023-09-08 DIAGNOSIS — L40.9 PSORIASIS: ICD-10-CM

## 2023-09-08 DIAGNOSIS — Z76.89 ENCOUNTER FOR SKIN CARE: Primary | ICD-10-CM

## 2023-09-08 DIAGNOSIS — L40.9 SCALP PSORIASIS: ICD-10-CM

## 2023-09-08 PROCEDURE — 1160F PR REVIEW ALL MEDS BY PRESCRIBER/CLIN PHARMACIST DOCUMENTED: ICD-10-PCS | Mod: CPTII,S$GLB,, | Performed by: DERMATOLOGY

## 2023-09-08 PROCEDURE — 1159F PR MEDICATION LIST DOCUMENTED IN MEDICAL RECORD: ICD-10-PCS | Mod: CPTII,S$GLB,, | Performed by: DERMATOLOGY

## 2023-09-08 PROCEDURE — 1160F RVW MEDS BY RX/DR IN RCRD: CPT | Mod: CPTII,S$GLB,, | Performed by: DERMATOLOGY

## 2023-09-08 PROCEDURE — 99204 OFFICE O/P NEW MOD 45 MIN: CPT | Mod: S$GLB,,, | Performed by: DERMATOLOGY

## 2023-09-08 PROCEDURE — 99204 PR OFFICE/OUTPT VISIT, NEW, LEVL IV, 45-59 MIN: ICD-10-PCS | Mod: S$GLB,,, | Performed by: DERMATOLOGY

## 2023-09-08 PROCEDURE — 99999 PR PBB SHADOW E&M-EST. PATIENT-LVL III: CPT | Mod: PBBFAC,,, | Performed by: DERMATOLOGY

## 2023-09-08 PROCEDURE — 99999 PR PBB SHADOW E&M-EST. PATIENT-LVL III: ICD-10-PCS | Mod: PBBFAC,,, | Performed by: DERMATOLOGY

## 2023-09-08 PROCEDURE — 1159F MED LIST DOCD IN RCRD: CPT | Mod: CPTII,S$GLB,, | Performed by: DERMATOLOGY

## 2023-09-08 RX ORDER — CALCIPOTRIENE 50 UG/G
OINTMENT TOPICAL 2 TIMES DAILY
Refills: 0 | Status: CANCELLED | OUTPATIENT
Start: 2023-09-08 | End: 2024-09-07

## 2023-09-08 RX ORDER — CLOBETASOL PROPIONATE 0.5 MG/G
CREAM TOPICAL 2 TIMES DAILY
Qty: 60 G | Refills: 1 | Status: SHIPPED | OUTPATIENT
Start: 2023-09-08 | End: 2023-11-09 | Stop reason: SDUPTHER

## 2023-09-08 NOTE — PATIENT INSTRUCTIONS
Avoid hot water   Mild soap Dove     Instructed to apply oral vitamin D oil on top of topical steroids regularly.  Use vitamin D oil regularly as a moisturizer even when lesions resolve.    Moisturize with coconut oil daily     Discussed all of the following:  Psoriasis is an inflammatory condition that affects the skin and nails. You may have patches of thick, red skin (plaques) covered with silvery scales. These often appear on the elbows, knees, legs, lower back, and scalp.  The plaques itch and can be painful. People with this condition are more likely to have emotional stress and depression.  Psoriasis is not contagious. It cant spread to someone else who touches it. But it can be inherited. It is an autoimmune skin disease. This means that the immune system has an abnormal reaction. It treats healthy skin like it is a foreign substance. This causes skin cells to grow faster than normal and to stack up in raised red patches. Psoriasis is a long-term (chronic) disease. You will have flare-ups that come and go over time.  Psoriasis is made worse by smoking, alcohol, stress, and some blood pressure medications.      Diet with increased fiber, avoidance of sugars, avoidance of dairy, avoidance of wheat and white flour (gluten) all discussed.  Avoid caffeine and alcohol and energy drinks.  Look up Tip Macario's anti inflammatory diet.  Don't worry about extraneous details, like avoidance of tomatoes and mushrooms.  No alcohol, smoking, vaping, tobacco products, illicit drugs.

## 2023-09-08 NOTE — PROGRESS NOTES
Subjective:      Patient ID:  Gilson Tan is a 62 y.o. male who presents for   Chief Complaint   Patient presents with    Psoriasis     Legs      Psoriasis - Initial  Affected locations: scalp, right lower leg and left lower leg  Duration: 18 years  Signs / symptoms: itching, dryness and scaling  Severity: mild to moderate  Timing: constant        Review of Systems   Constitutional: Negative.    HENT: Negative.     Respiratory: Negative.     Musculoskeletal: Negative.    Skin:  Positive for itching, rash and dry skin.       Objective:   Physical Exam   Constitutional: He appears well-developed and well-nourished.   Neurological: He is alert and oriented to person, place, and time.   Psychiatric: He has a normal mood and affect.        Diagram Legend     Erythematous scaling macule/papule c/w actinic keratosis       Vascular papule c/w angioma      Pigmented verrucoid papule/plaque c/w seborrheic keratosis      Yellow umbilicated papule c/w sebaceous hyperplasia      Irregularly shaped tan macule c/w lentigo     1-2 mm smooth white papules consistent with Milia      Movable subcutaneous cyst with punctum c/w epidermal inclusion cyst      Subcutaneous movable cyst c/w pilar cyst      Firm pink to brown papule c/w dermatofibroma      Pedunculated fleshy papule(s) c/w skin tag(s)      Evenly pigmented macule c/w junctional nevus     Mildly variegated pigmented, slightly irregular-bordered macule c/w mildly atypical nevus      Flesh colored to evenly pigmented papule c/w intradermal nevus       Pink pearly papule/plaque c/w basal cell carcinoma      Erythematous hyperkeratotic cursted plaque c/w SCC      Surgical scar with no sign of skin cancer recurrence      Open and closed comedones      Inflammatory papules and pustules      Verrucoid papule consistent consistent with wart     Erythematous eczematous patches and plaques     Dystrophic onycholytic nail with subungual debris c/w onychomycosis     Umbilicated papule     Erythematous-base heme-crusted tan verrucoid plaque consistent with inflamed seborrheic keratosis     Erythematous Silvery Scaling Plaque c/w Psoriasis     See annotation  Lower legs and scalp with psor patches.      Assessment / Plan:        Encounter for skin care  Good skin care regimen discussed including limiting to one bath or shower per day, using lukewarm water with mild soap and moisturization to skin once to twice daily.  Consider glycerin bar soap or Dove.  Consider organic coconut oil.    Psoriasis  -     clobetasoL (TEMOVATE) 0.05 % cream; Apply topically 2 (two) times daily. To psoriasis spots.  Combine with patient's calcipotriene cream as directed.Stop using steroid topical when skin is smooth and non itchy.  Do not treat dark or red coloring.  Dispense: 60 g; Refill: 1  Discussed with patient the etiology and pathogenesis of the disease or skin lesion(s) and possible treatments and aggravators.    Chronic nature of this condition discussed with patient.  Discussed all of the following:  Psoriasis is an inflammatory condition that affects the skin and nails. You may have patches of thick, red skin (plaques) covered with silvery scales. These often appear on the elbows, knees, legs, lower back, and scalp.  The plaques itch and can be painful. People with this condition are more likely to have emotional stress and depression.  Psoriasis is not contagious. It cant spread to someone else who touches it. But it can be inherited. It is an autoimmune skin disease. This means that the immune system has an abnormal reaction. It treats healthy skin like it is a foreign substance. This causes skin cells to grow faster than normal and to stack up in raised red patches. Psoriasis is a long-term (chronic) disease. You will have flare-ups that come and go over time.  Psoriasis is made worse by smoking, alcohol, stress, and some blood pressure medications.  Brochure given for patient education.  Reviewed with  patient different treatment options and associated risks.  Proper application of medications and or care for affected area(s) and condition(s) reviewed.  Use clob and pt's swapna cr bid focally prn.  Discussed with patient the risks of topical and injectable steroids, including, but not limited, to atrophy, hypopigmentation, rosacea, acne, glaucoma, cataracts, adrenal suppression, striae.  Do not touch eyes with medication.  Discussed with patient doing salt water baths or soaks to affected areas 2-3 times per week.  This can help eczema and psoriasis.  Alternatively, salt water poultice can be done with wet cloths.    Scalp psoriasis  -     clobetasoL (TEMOVATE) 0.05 % cream; Apply topically 2 (two) times daily. To psoriasis spots.  Combine with patient's calcipotriene cream as directed.Stop using steroid topical when skin is smooth and non itchy.  Do not treat dark or red coloring.  Dispense: 60 g; Refill: 1  Discussed with patient doing salt water baths or soaks to affected areas 2-3 times per week.  This can help eczema and psoriasis.  Alternatively, salt water poultice can be done with wet cloths.  Combine with pt's swapna cr bid focally.  Proper application of medications and or care for affected area(s) and condition(s) reviewed.  Previous [facility] labs and or records and notes reviewed and considered for their impact on our clinical decision making today.      Diet  Diet with increased fiber, avoidance of sugars, avoidance of dairy, avoidance of wheat and white flour (gluten) all discussed.  Avoid caffeine and alcohol and energy drinks.  Look up Tip Macario's anti inflammatory diet.  Don't worry about extraneous details, like avoidance of tomatoes and mushrooms.  No alcohol, smoking, vaping, tobacco products, illicit drugs.             Follow up in about 2 months (around 11/8/2023).

## 2023-11-09 ENCOUNTER — OFFICE VISIT (OUTPATIENT)
Dept: DERMATOLOGY | Facility: CLINIC | Age: 63
End: 2023-11-09
Payer: MEDICARE

## 2023-11-09 DIAGNOSIS — L40.9 PSORIASIS: ICD-10-CM

## 2023-11-09 DIAGNOSIS — L40.9 SCALP PSORIASIS: ICD-10-CM

## 2023-11-09 PROCEDURE — 99999 PR PBB SHADOW E&M-EST. PATIENT-LVL III: CPT | Mod: PBBFAC,,, | Performed by: DERMATOLOGY

## 2023-11-09 PROCEDURE — 1159F MED LIST DOCD IN RCRD: CPT | Mod: CPTII,S$GLB,, | Performed by: DERMATOLOGY

## 2023-11-09 PROCEDURE — 1160F RVW MEDS BY RX/DR IN RCRD: CPT | Mod: CPTII,S$GLB,, | Performed by: DERMATOLOGY

## 2023-11-09 PROCEDURE — 99214 OFFICE O/P EST MOD 30 MIN: CPT | Mod: S$GLB,,, | Performed by: DERMATOLOGY

## 2023-11-09 PROCEDURE — 1160F PR REVIEW ALL MEDS BY PRESCRIBER/CLIN PHARMACIST DOCUMENTED: ICD-10-PCS | Mod: CPTII,S$GLB,, | Performed by: DERMATOLOGY

## 2023-11-09 PROCEDURE — 99999 PR PBB SHADOW E&M-EST. PATIENT-LVL III: ICD-10-PCS | Mod: PBBFAC,,, | Performed by: DERMATOLOGY

## 2023-11-09 PROCEDURE — 1159F PR MEDICATION LIST DOCUMENTED IN MEDICAL RECORD: ICD-10-PCS | Mod: CPTII,S$GLB,, | Performed by: DERMATOLOGY

## 2023-11-09 PROCEDURE — 99214 PR OFFICE/OUTPT VISIT, EST, LEVL IV, 30-39 MIN: ICD-10-PCS | Mod: S$GLB,,, | Performed by: DERMATOLOGY

## 2023-11-09 RX ORDER — CLOBETASOL PROPIONATE 0.46 MG/ML
SOLUTION TOPICAL 2 TIMES DAILY
Qty: 60 ML | Refills: 1 | Status: SHIPPED | OUTPATIENT
Start: 2023-11-09 | End: 2023-12-14 | Stop reason: SDUPTHER

## 2023-11-09 RX ORDER — APREMILAST 10-20-30MG
KIT ORAL
Qty: 55 TABLET | Refills: 0 | Status: ACTIVE | OUTPATIENT
Start: 2023-11-09

## 2023-11-09 RX ORDER — CLOBETASOL PROPIONATE 0.5 MG/G
CREAM TOPICAL 2 TIMES DAILY
Qty: 60 G | Refills: 1 | Status: SHIPPED | OUTPATIENT
Start: 2023-11-09 | End: 2023-12-14 | Stop reason: SDUPTHER

## 2023-11-09 NOTE — PATIENT INSTRUCTIONS
Good skin care regimen discussed including limiting to one bath or shower per day, using lukewarm water with mild soap and moisturization to skin once to twice daily.  Consider glycerin bar soap or Dove.  Consider organic coconut oil.

## 2023-11-09 NOTE — PROGRESS NOTES
Subjective:      Patient ID:  Gilson Tan is a 63 y.o. male who presents for   Chief Complaint   Patient presents with    Psoriasis     F/u     Psoriasis - Follow-up  Symptom course: unchanged  Affected locations: left arm, right arm, scalp, neck, left lower leg and right lower leg  Signs / symptoms: itching and scaling  Severity: mild to moderate      Review of Systems   Constitutional:  Negative for fever and chills.   HENT:  Negative for sore throat.    Respiratory:  Negative for cough.    Skin:  Positive for itching.       Objective:   Physical Exam   Constitutional: He appears well-developed and well-nourished.   Neurological: He is alert and oriented to person, place, and time.   Psychiatric: He has a normal mood and affect.   Skin:                    Diagram Legend     Erythematous scaling macule/papule c/w actinic keratosis       Vascular papule c/w angioma      Pigmented verrucoid papule/plaque c/w seborrheic keratosis      Yellow umbilicated papule c/w sebaceous hyperplasia      Irregularly shaped tan macule c/w lentigo     1-2 mm smooth white papules consistent with Milia      Movable subcutaneous cyst with punctum c/w epidermal inclusion cyst      Subcutaneous movable cyst c/w pilar cyst      Firm pink to brown papule c/w dermatofibroma      Pedunculated fleshy papule(s) c/w skin tag(s)      Evenly pigmented macule c/w junctional nevus     Mildly variegated pigmented, slightly irregular-bordered macule c/w mildly atypical nevus      Flesh colored to evenly pigmented papule c/w intradermal nevus       Pink pearly papule/plaque c/w basal cell carcinoma      Erythematous hyperkeratotic cursted plaque c/w SCC      Surgical scar with no sign of skin cancer recurrence      Open and closed comedones      Inflammatory papules and pustules      Verrucoid papule consistent consistent with wart     Erythematous eczematous patches and plaques     Dystrophic onycholytic nail with subungual debris c/w onychomycosis      Umbilicated papule    Erythematous-base heme-crusted tan verrucoid plaque consistent with inflamed seborrheic keratosis     Erythematous Silvery Scaling Plaque c/w Psoriasis     See annotation                Assessment / Plan:        Psoriasis  -     clobetasoL (TEMOVATE) 0.05 % cream; Apply topically 2 (two) times daily. To psoriasis spots.  Combine with patient's calcipotriene cream as directed.Stop using steroid topical when skin is smooth and non itchy.  Do not treat dark or red coloring.  Dispense: 60 g; Refill: 1  -     OTEZLA STARTER 10 mg (4)-20 mg (4)-30 mg (47) DsPk; Titration: Take 1 tablet po bid as directed  Dispense: 56 tablet; Refill: 0  UVB therapy as optional treatment reviewed.  To be done at [facility].  Theoretical risks about future skin cancer reviewed.  Pt wants otezla.  Reports humira in the past wo help.  Discussed the benefits and risks of Otezla including but not limited to nausea, vomiting, diarrhea, weight loss, and depression.    Start Otezla at 30mg bid after standard tapering up to this dose.    Otezla is a pregnancy category C and dose needs to be reduced in patients with severe renal impairment (Cr cl < 30ml/min)    Otezla is not recommended with strong CII311 enzyme inducers ie rifamin and anti-epileptics)  Cont clob prn.    Scalp psoriasis  -     clobetasoL (TEMOVATE) 0.05 % cream; Apply topically 2 (two) times daily. To psoriasis spots.  Combine with patient's calcipotriene cream as directed.Stop using steroid topical when skin is smooth and non itchy.  Do not treat dark or red coloring.  Dispense: 60 g; Refill: 1  -     clobetasoL (TEMOVATE) 0.05 % external solution; Apply topically 2 (two) times daily. Prn scalp irritation  Dispense: 60 mL; Refill: 1  -     OTEZLA STARTER 10 mg (4)-20 mg (4)-30 mg (47) DsPk; Titration: Take 1 tablet po bid as directed  Dispense: 56 tablet; Refill: 0  Reviewed with patient different treatment options and associated risks.  Pt wants  otezla.  Cont clob prn.           Follow up in about 5 weeks (around 12/14/2023).

## 2023-11-10 PROBLEM — L40.9 PSORIASIS: Status: ACTIVE | Noted: 2023-11-10

## 2023-12-14 ENCOUNTER — OFFICE VISIT (OUTPATIENT)
Dept: DERMATOLOGY | Facility: CLINIC | Age: 63
End: 2023-12-14
Payer: MEDICARE

## 2023-12-14 ENCOUNTER — LAB VISIT (OUTPATIENT)
Dept: LAB | Facility: HOSPITAL | Age: 63
End: 2023-12-14
Attending: DERMATOLOGY
Payer: MEDICARE

## 2023-12-14 DIAGNOSIS — L40.9 SCALP PSORIASIS: ICD-10-CM

## 2023-12-14 DIAGNOSIS — L40.9 PSORIASIS: ICD-10-CM

## 2023-12-14 DIAGNOSIS — L29.9 PRURITUS, UNSPECIFIED: ICD-10-CM

## 2023-12-14 DIAGNOSIS — L29.9 ITCH: ICD-10-CM

## 2023-12-14 DIAGNOSIS — Z76.89 ENCOUNTER FOR SKIN CARE: Primary | ICD-10-CM

## 2023-12-14 LAB
ALBUMIN SERPL BCP-MCNC: 4.3 G/DL (ref 3.5–5.2)
ALP SERPL-CCNC: 125 U/L (ref 55–135)
ALT SERPL W/O P-5'-P-CCNC: 25 U/L (ref 10–44)
ANION GAP SERPL CALC-SCNC: 10 MMOL/L (ref 8–16)
AST SERPL-CCNC: 32 U/L (ref 10–40)
BASOPHILS # BLD AUTO: 0.07 K/UL (ref 0–0.2)
BASOPHILS NFR BLD: 1.1 % (ref 0–1.9)
BILIRUB SERPL-MCNC: 0.7 MG/DL (ref 0.1–1)
BUN SERPL-MCNC: 11 MG/DL (ref 8–23)
CALCIUM SERPL-MCNC: 9.2 MG/DL (ref 8.7–10.5)
CHLORIDE SERPL-SCNC: 105 MMOL/L (ref 95–110)
CO2 SERPL-SCNC: 25 MMOL/L (ref 23–29)
CREAT SERPL-MCNC: 1.2 MG/DL (ref 0.5–1.4)
DIFFERENTIAL METHOD: ABNORMAL
EOSINOPHIL # BLD AUTO: 0.1 K/UL (ref 0–0.5)
EOSINOPHIL NFR BLD: 2.1 % (ref 0–8)
ERYTHROCYTE [DISTWIDTH] IN BLOOD BY AUTOMATED COUNT: 13.2 % (ref 11.5–14.5)
EST. GFR  (NO RACE VARIABLE): >60 ML/MIN/1.73 M^2
GLUCOSE SERPL-MCNC: 92 MG/DL (ref 70–110)
HBV SURFACE AG SERPL QL IA: NORMAL
HCT VFR BLD AUTO: 32.6 % (ref 40–54)
HCV AB SERPL QL IA: NORMAL
HGB BLD-MCNC: 11.6 G/DL (ref 14–18)
IMM GRANULOCYTES # BLD AUTO: 0.02 K/UL (ref 0–0.04)
IMM GRANULOCYTES NFR BLD AUTO: 0.3 % (ref 0–0.5)
LYMPHOCYTES # BLD AUTO: 1.7 K/UL (ref 1–4.8)
LYMPHOCYTES NFR BLD: 27 % (ref 18–48)
MCH RBC QN AUTO: 29.2 PG (ref 27–31)
MCHC RBC AUTO-ENTMCNC: 35.6 G/DL (ref 32–36)
MCV RBC AUTO: 82 FL (ref 82–98)
MONOCYTES # BLD AUTO: 0.4 K/UL (ref 0.3–1)
MONOCYTES NFR BLD: 6.8 % (ref 4–15)
NEUTROPHILS # BLD AUTO: 3.9 K/UL (ref 1.8–7.7)
NEUTROPHILS NFR BLD: 62.7 % (ref 38–73)
NRBC BLD-RTO: 0 /100 WBC
PLATELET # BLD AUTO: 273 K/UL (ref 150–450)
PMV BLD AUTO: 10 FL (ref 9.2–12.9)
POTASSIUM SERPL-SCNC: 4.4 MMOL/L (ref 3.5–5.1)
PROT SERPL-MCNC: 8.1 G/DL (ref 6–8.4)
RBC # BLD AUTO: 3.97 M/UL (ref 4.6–6.2)
SODIUM SERPL-SCNC: 140 MMOL/L (ref 136–145)
TSH SERPL DL<=0.005 MIU/L-ACNC: 0.57 UIU/ML (ref 0.4–4)
WBC # BLD AUTO: 6.22 K/UL (ref 3.9–12.7)

## 2023-12-14 PROCEDURE — 84443 ASSAY THYROID STIM HORMONE: CPT | Performed by: DERMATOLOGY

## 2023-12-14 PROCEDURE — 1160F PR REVIEW ALL MEDS BY PRESCRIBER/CLIN PHARMACIST DOCUMENTED: ICD-10-PCS | Mod: CPTII,S$GLB,, | Performed by: DERMATOLOGY

## 2023-12-14 PROCEDURE — 84630 ASSAY OF ZINC: CPT | Performed by: DERMATOLOGY

## 2023-12-14 PROCEDURE — 99999 PR PBB SHADOW E&M-EST. PATIENT-LVL III: ICD-10-PCS | Mod: PBBFAC,,, | Performed by: DERMATOLOGY

## 2023-12-14 PROCEDURE — 1159F PR MEDICATION LIST DOCUMENTED IN MEDICAL RECORD: ICD-10-PCS | Mod: CPTII,S$GLB,, | Performed by: DERMATOLOGY

## 2023-12-14 PROCEDURE — 99999 PR PBB SHADOW E&M-EST. PATIENT-LVL III: CPT | Mod: PBBFAC,,, | Performed by: DERMATOLOGY

## 2023-12-14 PROCEDURE — 1159F MED LIST DOCD IN RCRD: CPT | Mod: CPTII,S$GLB,, | Performed by: DERMATOLOGY

## 2023-12-14 PROCEDURE — 85025 COMPLETE CBC W/AUTO DIFF WBC: CPT | Performed by: DERMATOLOGY

## 2023-12-14 PROCEDURE — 82652 VIT D 1 25-DIHYDROXY: CPT | Performed by: DERMATOLOGY

## 2023-12-14 PROCEDURE — 1160F RVW MEDS BY RX/DR IN RCRD: CPT | Mod: CPTII,S$GLB,, | Performed by: DERMATOLOGY

## 2023-12-14 PROCEDURE — 84402 ASSAY OF FREE TESTOSTERONE: CPT | Performed by: DERMATOLOGY

## 2023-12-14 PROCEDURE — 86060 ANTISTREPTOLYSIN O TITER: CPT | Performed by: DERMATOLOGY

## 2023-12-14 PROCEDURE — 86803 HEPATITIS C AB TEST: CPT | Performed by: DERMATOLOGY

## 2023-12-14 PROCEDURE — 99215 PR OFFICE/OUTPT VISIT, EST, LEVL V, 40-54 MIN: ICD-10-PCS | Mod: S$GLB,,, | Performed by: DERMATOLOGY

## 2023-12-14 PROCEDURE — 36415 COLL VENOUS BLD VENIPUNCTURE: CPT | Mod: PN | Performed by: DERMATOLOGY

## 2023-12-14 PROCEDURE — 87340 HEPATITIS B SURFACE AG IA: CPT | Performed by: DERMATOLOGY

## 2023-12-14 PROCEDURE — 80053 COMPREHEN METABOLIC PANEL: CPT | Performed by: DERMATOLOGY

## 2023-12-14 PROCEDURE — 99215 OFFICE O/P EST HI 40 MIN: CPT | Mod: S$GLB,,, | Performed by: DERMATOLOGY

## 2023-12-14 RX ORDER — CLOBETASOL PROPIONATE 0.46 MG/ML
SOLUTION TOPICAL 2 TIMES DAILY
Qty: 60 ML | Refills: 1 | Status: SHIPPED | OUTPATIENT
Start: 2023-12-14

## 2023-12-14 RX ORDER — APREMILAST 30 MG/1
30 TABLET, FILM COATED ORAL 2 TIMES DAILY
Qty: 60 TABLET | Refills: 1 | Status: ACTIVE | OUTPATIENT
Start: 2023-12-14

## 2023-12-14 RX ORDER — CLOBETASOL PROPIONATE 0.5 MG/G
CREAM TOPICAL 2 TIMES DAILY
Qty: 60 G | Refills: 1 | Status: SHIPPED | OUTPATIENT
Start: 2023-12-14

## 2023-12-14 NOTE — PATIENT INSTRUCTIONS
Instructed to apply A&D ointment on top of topical steroids regularly.  Use A&D ointment regularly as a moisturizer even when lesions resolve.      Apply clobetasol cream as needed for itch     Patient to start 3% crude coal tar shampoo to be used as scalp soaks for at least 10 minutes, longer if possible, per their regular shampooing schedule.  Can also be applied as directed to other parts of the body.  Be careful around eyes.      Can take Claritin or Zyrtec 1-2x day as needed.  Warned of dry mouth, difficulty urinating, hyperactivity, blurry vision, and new studies regarding dementia.      Patient to consider better supplement brands like Jarrows, Nature's Way, Solaray, VALERIO, Pure Encapsulations, and Now.  Top of the line companies are [employer] and [employer].  Can also go to [employer] and look at different brands there.  For iron and zinc, consider bases of glycinate, acetate, citrate, picolinate.    Can also see what is available at the [facility].

## 2023-12-14 NOTE — PROGRESS NOTES
Subjective:      Patient ID:  Gilson Tan is a 63 y.o. male who presents for   Chief Complaint   Patient presents with    Psoriasis     Follow-up      Psoriasis - Follow-up  Symptom course: unchanged  Signs / symptoms: dryness, itching, scaling and burning        Review of Systems   Constitutional: Negative.    HENT: Negative.     Respiratory: Negative.     Gastrointestinal:  Negative for abdominal pain.   Musculoskeletal: Negative.    Skin:  Positive for itching, rash and dry skin.   Psychiatric/Behavioral:  Negative for depressed mood.        Objective:   Physical Exam   Constitutional: He appears well-developed and well-nourished.   Neurological: He is alert and oriented to person, place, and time.   Psychiatric: He has a normal mood and affect.   Skin:               Diagram Legend     Erythematous scaling macule/papule c/w actinic keratosis       Vascular papule c/w angioma      Pigmented verrucoid papule/plaque c/w seborrheic keratosis      Yellow umbilicated papule c/w sebaceous hyperplasia      Irregularly shaped tan macule c/w lentigo     1-2 mm smooth white papules consistent with Milia      Movable subcutaneous cyst with punctum c/w epidermal inclusion cyst      Subcutaneous movable cyst c/w pilar cyst      Firm pink to brown papule c/w dermatofibroma      Pedunculated fleshy papule(s) c/w skin tag(s)      Evenly pigmented macule c/w junctional nevus     Mildly variegated pigmented, slightly irregular-bordered macule c/w mildly atypical nevus      Flesh colored to evenly pigmented papule c/w intradermal nevus       Pink pearly papule/plaque c/w basal cell carcinoma      Erythematous hyperkeratotic cursted plaque c/w SCC      Surgical scar with no sign of skin cancer recurrence      Open and closed comedones      Inflammatory papules and pustules      Verrucoid papule consistent consistent with wart     Erythematous eczematous patches and plaques     Dystrophic onycholytic nail with subungual debris c/w  onychomycosis     Umbilicated papule    Erythematous-base heme-crusted tan verrucoid plaque consistent with inflamed seborrheic keratosis     Erythematous Silvery Scaling Plaque c/w Psoriasis     See annotation                Assessment / Plan:        Encounter for skin care  Discussed with patient doing salt water baths or soaks to affected areas 2-3 times per week.  This can help eczema and psoriasis.  Alternatively, salt water poultice can be done with wet cloths.  Apply A&D ointment on top of topical steroids and regularly as a moisturizer.      Scalp psoriasis  -     clobetasoL (TEMOVATE) 0.05 % external solution; Apply topically 2 (two) times daily. Prn scalp irritation  Dispense: 60 mL; Refill: 1  -     clobetasoL (TEMOVATE) 0.05 % cream; Apply topically 2 (two) times daily. To psoriasis spots.  Combine with patient's calcipotriene cream as directed.Stop using steroid topical when skin is smooth and non itchy.  Do not treat dark or red coloring.  Dispense: 60 g; Refill: 1  -     apremilast (OTEZLA) 30 mg Tab; Take 1 tablet (30 mg total) by mouth 2 (two) times daily.  Dispense: 60 tablet; Refill: 1  -     Zinc; Future; Expected date: 12/14/2023  -     Streptococcal Antibodes (ASO Titer); Future; Expected date: 12/14/2023  -     TSH; Future; Expected date: 12/14/2023  -     Hepatitis C Antibody; Future; Expected date: 12/14/2023  -     Hepatitis B Surface Antigen; Future; Expected date: 12/14/2023  -     Comprehensive Metabolic Panel; Future; Expected date: 12/14/2023  -     CBC Auto Differential; Future; Expected date: 12/14/2023  -     Calcitriol; Future; Expected date: 12/14/2023  -     TESTOSTERONE, FREE (DIALYSIS) AND TOTAL, LC/MS/MS; Future; Expected date: 12/14/2023  Reviewed with patient different treatment options and associated risks.    Psoriasis  -     clobetasoL (TEMOVATE) 0.05 % cream; Apply topically 2 (two) times daily. To psoriasis spots.  Combine with patient's calcipotriene cream as  directed.Stop using steroid topical when skin is smooth and non itchy.  Do not treat dark or red coloring.  Dispense: 60 g; Refill: 1  -     apremilast (OTEZLA) 30 mg Tab; Take 1 tablet (30 mg total) by mouth 2 (two) times daily.  Dispense: 60 tablet; Refill: 1  -     Zinc; Future; Expected date: 12/14/2023  -     Streptococcal Antibodes (ASO Titer); Future; Expected date: 12/14/2023  -     TSH; Future; Expected date: 12/14/2023  -     Hepatitis C Antibody; Future; Expected date: 12/14/2023  -     Hepatitis B Surface Antigen; Future; Expected date: 12/14/2023  -     Comprehensive Metabolic Panel; Future; Expected date: 12/14/2023  -     CBC Auto Differential; Future; Expected date: 12/14/2023  -     Calcitriol; Future; Expected date: 12/14/2023  -     TESTOSTERONE, FREE (DIALYSIS) AND TOTAL, LC/MS/MS; Future; Expected date: 12/14/2023  Patient to start 2-5% crude coal tar shampoo to be used as scalp soaks for at least 10 minutes, longer if possible, per their regular shampooing schedule.  Can also be applied as directed to other parts of the body.  Be careful around eyes.  Proper application of medications and or care for affected area(s) and condition(s) reviewed.  Discussed with patient the need for laboratory work up for further evaluation.  Discussed that such investigation may not be helpful.  Refill clob x 2.  UVB therapy as optional treatment reviewed.  To be done at [facility].  Theoretical risks about future skin cancer reviewed.  Pt wishes to wait on uvb.  Give otezla another solid 1-2 mths before dc.    Unstable condition.  Cons mtx?  Cons acitretin?    Itch  -     Hepatitis C Antibody; Future; Expected date: 12/14/2023  -     Hepatitis B Surface Antigen; Future; Expected date: 12/14/2023  -     Comprehensive Metabolic Panel; Future; Expected date: 12/14/2023  -     CBC Auto Differential; Future; Expected date: 12/14/2023  Discussed with patient the need for laboratory work up for further evaluation.   Discussed that such investigation may not be helpful.  Reviewed with patient different treatment options and associated risks.  Patient to start 2-5% crude coal tar shampoo to be used as scalp soaks for at least 10 minutes, longer if possible, per their regular shampooing schedule.  Can also be applied as directed to other parts of the body.  Be careful around eyes.  Prn zyrtec or claritin.    Pruritus, unspecified  -     TSH; Future; Expected date: 12/14/2023  As above.             Follow up in about 2 months (around 2/14/2024).

## 2023-12-18 LAB
1,25(OH)2D3 SERPL-MCNC: 69 PG/ML (ref 20–79)
ASO AB SERPL-ACNC: 31 IU/ML

## 2023-12-19 LAB — ZINC SERPL-MCNC: 98 UG/DL (ref 60–130)

## 2023-12-21 ENCOUNTER — TELEPHONE (OUTPATIENT)
Dept: DERMATOLOGY | Facility: CLINIC | Age: 63
End: 2023-12-21
Payer: MEDICARE

## 2023-12-21 NOTE — TELEPHONE ENCOUNTER
----- Message from Germeias Morales MD sent at 12/21/2023 10:04 AM CST -----  Inform pt labs overall good.  Is a bit anemic.  Try 10-15 mg iron qd.  Keep fu appt.    Patient to consider better supplement brands like Jarrows, Nature's Way, Solaray, VALERIO, Pure Encapsulations, and Now.  Top of the line companies are [employer] and [employer].  Can also go to [employer] and look at different brands there.  For iron and zinc, consider bases of glycinate, acetate, citrate, picolinate.    Can also see what is available at the [facility].    ----- Message -----  From: [facility] [employer] [facility]  Sent: 12/14/2023   6:22 PM CST  To: Geremias Morales MD

## 2023-12-26 LAB
TESTOST FREE SERPL-MCNC: 82.7 PG/ML (ref 35–155)
TESTOST SERPL-MCNC: 425 NG/DL (ref 250–1100)

## 2024-01-02 ENCOUNTER — TELEPHONE (OUTPATIENT)
Dept: DERMATOLOGY | Facility: CLINIC | Age: 64
End: 2024-01-02
Payer: MEDICARE

## 2024-01-02 NOTE — TELEPHONE ENCOUNTER
LVM for the pt to call back to discuss lab results.      Tory       ----- Message from Geremias Morales MD sent at 12/21/2023 10:04 AM CST -----  Inform pt labs overall good.  Is a bit anemic.  Try 10-15 mg iron qd.  Keep fu appt.    Patient to consider better supplement brands like Jarrows, Nature's Way, Solaray, VALERIO, Pure Encapsulations, and Now.  Top of the line companies are [employer] and [employer].  Can also go to [employer] and look at different brands there.  For iron and zinc, consider bases of glycinate, acetate, citrate, picolinate.    Can also see what is available at the [facility].    ----- Message -----  From: [facility] [employer] [facility] Interface  Sent: 12/14/2023   6:22 PM CST  To: Geremias Morales MD

## 2024-01-18 ENCOUNTER — TELEPHONE (OUTPATIENT)
Dept: SMOKING CESSATION | Facility: CLINIC | Age: 64
End: 2024-01-18
Payer: MEDICARE

## 2024-01-18 NOTE — TELEPHONE ENCOUNTER
1st attempt regarding smoking cessation quit 1 episode.  Patient stated that he will call counselor back to give updated information.

## 2024-02-08 RX ORDER — VALSARTAN AND HYDROCHLOROTHIAZIDE 160; 12.5 MG/1; MG/1
TABLET, FILM COATED ORAL
Qty: 90 TABLET | Refills: 0 | Status: SHIPPED | OUTPATIENT
Start: 2024-02-08

## 2024-02-08 RX ORDER — ROSUVASTATIN CALCIUM 20 MG/1
20 TABLET, COATED ORAL
Qty: 30 TABLET | Refills: 0 | Status: SHIPPED | OUTPATIENT
Start: 2024-02-08 | End: 2024-03-26

## 2024-02-08 NOTE — TELEPHONE ENCOUNTER
Left a voicemail for the patient. Dr. Archibald was able to refill his medications but only for 30 days. He would like him to follow-up in-person for visit before he can receive any future refills.

## 2024-03-25 NOTE — TELEPHONE ENCOUNTER
Refill Routing Note   Medication(s) are not appropriate for processing by [facility] for the following reason(s):        Responsible provider unclear  Required labs outdated    [facility] action(s):  Defer        Medication Therapy Plan: no pcp      Appointments  past 12m or future 3m with PCP    Date Provider   Last Visit   2/17/2023 Raleigh Archibald MD   Next Visit   Visit date not found Raleigh Archibald MD   ED visits in past 90 days: 0        Note composed:12:19 PM 03/25/2024

## 2024-03-26 RX ORDER — ROSUVASTATIN CALCIUM 20 MG/1
20 TABLET, COATED ORAL
Qty: 30 TABLET | Refills: 0 | Status: SHIPPED | OUTPATIENT
Start: 2024-03-26

## 2024-03-26 RX ORDER — ROSUVASTATIN CALCIUM 20 MG/1
20 TABLET, COATED ORAL
Qty: 90 TABLET | OUTPATIENT
Start: 2024-03-26

## 2024-03-26 NOTE — TELEPHONE ENCOUNTER
Left a voicemail for the patient. Dr. Archibald was able to refill his rosuvastatin but only for 30 days. The patient needs to establish care with Dr. Archibald or another provider to receive any future refills.

## 2024-03-26 NOTE — TELEPHONE ENCOUNTER
Refill Decision Note  Quick DC. Request already responded to by other means (e.g. phone or fax)      Gilson Lee  is requesting a refill authorization.  Brief Assessment and Rationale for Refill:  Quick Discontinue     Medication Therapy Plan:  Receipt confirmed by pharmacy (3/26/2024  6:09 AM CDT) gavin      Comments:     Note composed:8:18 AM 03/26/2024

## 2024-04-19 ENCOUNTER — OFFICE VISIT (OUTPATIENT)
Dept: DERMATOLOGY | Facility: CLINIC | Age: 64
End: 2024-04-19
Payer: MEDICARE

## 2024-04-19 DIAGNOSIS — L40.9 PSORIASIS: Primary | ICD-10-CM

## 2024-04-19 DIAGNOSIS — D64.9 LOW HEMOGLOBIN: ICD-10-CM

## 2024-04-19 DIAGNOSIS — Z79.899 HIGH RISK MEDICATION USE: ICD-10-CM

## 2024-04-19 DIAGNOSIS — L40.9 SCALP PSORIASIS: ICD-10-CM

## 2024-04-19 PROCEDURE — 1159F MED LIST DOCD IN RCRD: CPT | Mod: CPTII,S$GLB,, | Performed by: DERMATOLOGY

## 2024-04-19 PROCEDURE — 99214 OFFICE O/P EST MOD 30 MIN: CPT | Mod: S$GLB,,, | Performed by: DERMATOLOGY

## 2024-04-19 PROCEDURE — G2211 COMPLEX E/M VISIT ADD ON: HCPCS | Mod: S$GLB,,, | Performed by: DERMATOLOGY

## 2024-04-19 PROCEDURE — 99213 OFFICE O/P EST LOW 20 MIN: CPT | Mod: PBBFAC,PN | Performed by: DERMATOLOGY

## 2024-04-19 PROCEDURE — 99999 PR PBB SHADOW E&M-EST. PATIENT-LVL III: CPT | Mod: PBBFAC,,, | Performed by: DERMATOLOGY

## 2024-04-19 PROCEDURE — 1160F RVW MEDS BY RX/DR IN RCRD: CPT | Mod: CPTII,S$GLB,, | Performed by: DERMATOLOGY

## 2024-04-19 RX ORDER — CLOBETASOL PROPIONATE 0.46 MG/ML
SOLUTION TOPICAL 2 TIMES DAILY
Qty: 60 ML | Refills: 1 | Status: SHIPPED | OUTPATIENT
Start: 2024-04-19

## 2024-04-19 RX ORDER — CLOBETASOL PROPIONATE 0.5 MG/G
CREAM TOPICAL 2 TIMES DAILY
Qty: 60 G | Refills: 1 | Status: SHIPPED | OUTPATIENT
Start: 2024-04-19

## 2024-04-19 NOTE — PROGRESS NOTES
Subjective:      Patient ID:  Gilson Tan is a 63 y.o. male who presents for   Chief Complaint   Patient presents with    Psoriasis     Psoriasis - Initial  Affected locations: right lower leg  Signs / symptoms: itching, dryness and redness        Review of Systems   Constitutional: Negative.    HENT: Negative.     Respiratory: Negative.     Musculoskeletal: Negative.        Objective:   Physical Exam   Constitutional: He appears well-developed and well-nourished.   Neurological: He is alert and oriented to person, place, and time.   Skin:               Diagram Legend     Erythematous scaling macule/papule c/w actinic keratosis       Vascular papule c/w angioma      Pigmented verrucoid papule/plaque c/w seborrheic keratosis      Yellow umbilicated papule c/w sebaceous hyperplasia      Irregularly shaped tan macule c/w lentigo     1-2 mm smooth white papules consistent with Milia      Movable subcutaneous cyst with punctum c/w epidermal inclusion cyst      Subcutaneous movable cyst c/w pilar cyst      Firm pink to brown papule c/w dermatofibroma      Pedunculated fleshy papule(s) c/w skin tag(s)      Evenly pigmented macule c/w junctional nevus     Mildly variegated pigmented, slightly irregular-bordered macule c/w mildly atypical nevus      Flesh colored to evenly pigmented papule c/w intradermal nevus       Pink pearly papule/plaque c/w basal cell carcinoma      Erythematous hyperkeratotic cursted plaque c/w SCC      Surgical scar with no sign of skin cancer recurrence      Open and closed comedones      Inflammatory papules and pustules      Verrucoid papule consistent consistent with wart     Erythematous eczematous patches and plaques     Dystrophic onycholytic nail with subungual debris c/w onychomycosis     Umbilicated papule    Erythematous-base heme-crusted tan verrucoid plaque consistent with inflamed seborrheic keratosis     Erythematous Silvery Scaling Plaque c/w Psoriasis     See  annotation            Assessment / Plan:        Psoriasis  -     clobetasoL (TEMOVATE) 0.05 % cream; Apply topically 2 (two) times daily. To psoriasis spots.  Combine with patient's calcipotriene cream as directed.Stop using steroid topical when skin is smooth and non itchy.  Do not treat dark or red coloring.  Dispense: 60 g; Refill: 1  Pt reports not much change with otezla.  Pt doesn't want uvb or immunosuppressives.  Discussed benefits and risks of MTX therapy including but not limited to oral ulcers, GI upset, fatigue, bone marrow suppression, pulmonary fibrosis, hepatotoxicity.  MTX brochure discussed and provided to patient.  Patient expresses understanding.    Check baseline CBC, CMP, Hep B, Hep C and Quantiferon gold.     If labs are WNL, start MTX at 10mg qwk taken in divided dose each week, Folic acid 1mg qd except on day taking MTX.    Will need CBC at 2 - 4 weeks after first dose then will need CBC and LFTs q 3 months and CMP q 6 months. Will need Quantiferon gold annually.    Discussed benefits and risks of Soriatane including but not limited to elevated triglycerides and hepatotoxicity.    Check baseline CMP and lipid panel.    If labs WNL, start Soriatane at 10 mg p.o. qd. Will need LFTs and Lipid panel in 1 month then q 3 -6 months.    Pt wants to try acitretin.    Check labs first.    Scalp psoriasis  -     clobetasoL (TEMOVATE) 0.05 % cream; Apply topically 2 (two) times daily. To psoriasis spots.  Combine with patient's calcipotriene cream as directed.Stop using steroid topical when skin is smooth and non itchy.  Do not treat dark or red coloring.  Dispense: 60 g; Refill: 1  -     clobetasoL (TEMOVATE) 0.05 % external solution; Apply topically 2 (two) times daily. Prn scalp irritation  Dispense: 60 mL; Refill: 1  Acitretin should help.  Patient to start 2-5% crude coal tar shampoo to be used as scalp soaks for at least 10 minutes, longer if possible, per their regular shampooing schedule.  Can  also be applied as directed to other parts of the body.  Be careful around eyes.    High risk medication use  -     Lipid Panel; Future; Expected date: 04/19/2024  -     Hepatic Function Panel; Future; Expected date: 04/19/2024  Discussed with patient the need for laboratory work up for further evaluation.  Discussed that such investigation may not be helpful.    Low hemoglobin  -     Iron and TIBC; Future; Expected date: 04/19/2024  -     Ferritin; Future; Expected date: 04/19/2024  Previous [facility] labs and or records and notes reviewed and considered for their impact on our clinical decision making today.             Follow up if symptoms worsen or fail to improve, for Post labs.

## 2024-04-19 NOTE — PATIENT INSTRUCTIONS
Patient to start 2-5% crude coal tar shampoo to be used as scalp soaks for at least 10 minutes, longer if possible, per their regular shampooing schedule.  Can also be applied as directed to other parts of the body.  Be careful around eyes.     Avoid hot water     Instructed to apply oral vitamin D oil on top of topical steroids regularly.  Use vitamin D oil regularly as a moisturizer even when lesions resolve.    Clobetasol as needed for itch

## 2024-04-24 ENCOUNTER — LAB VISIT (OUTPATIENT)
Dept: LAB | Facility: HOSPITAL | Age: 64
End: 2024-04-24
Payer: MEDICARE

## 2024-04-24 DIAGNOSIS — D64.9 LOW HEMOGLOBIN: ICD-10-CM

## 2024-04-24 DIAGNOSIS — Z79.899 HIGH RISK MEDICATION USE: ICD-10-CM

## 2024-04-24 LAB
ALBUMIN SERPL BCP-MCNC: 4.6 G/DL (ref 3.5–5.2)
ALP SERPL-CCNC: 78 U/L (ref 55–135)
ALT SERPL W/O P-5'-P-CCNC: 24 U/L (ref 10–44)
AST SERPL-CCNC: 35 U/L (ref 10–40)
BILIRUB DIRECT SERPL-MCNC: 0.5 MG/DL (ref 0.1–0.3)
BILIRUB SERPL-MCNC: 1.3 MG/DL (ref 0.1–1)
CHOLEST SERPL-MCNC: 150 MG/DL (ref 120–199)
CHOLEST/HDLC SERPL: 3 {RATIO} (ref 2–5)
FERRITIN SERPL-MCNC: 277 NG/ML (ref 20–300)
HDLC SERPL-MCNC: 50 MG/DL (ref 40–75)
HDLC SERPL: 33.3 % (ref 20–50)
IRON SERPL-MCNC: 105 UG/DL (ref 45–160)
LDLC SERPL CALC-MCNC: 83.2 MG/DL (ref 63–159)
NONHDLC SERPL-MCNC: 100 MG/DL
PROT SERPL-MCNC: 8.4 G/DL (ref 6–8.4)
SATURATED IRON: 28 % (ref 20–50)
TOTAL IRON BINDING CAPACITY: 377 UG/DL (ref 250–450)
TRANSFERRIN SERPL-MCNC: 255 MG/DL (ref 200–375)
TRIGL SERPL-MCNC: 84 MG/DL (ref 30–150)

## 2024-04-24 PROCEDURE — 80076 HEPATIC FUNCTION PANEL: CPT | Performed by: DERMATOLOGY

## 2024-04-24 PROCEDURE — 36415 COLL VENOUS BLD VENIPUNCTURE: CPT | Mod: PN | Performed by: DERMATOLOGY

## 2024-04-24 PROCEDURE — 83540 ASSAY OF IRON: CPT | Performed by: DERMATOLOGY

## 2024-04-24 PROCEDURE — 80061 LIPID PANEL: CPT | Performed by: DERMATOLOGY

## 2024-04-24 PROCEDURE — 82728 ASSAY OF FERRITIN: CPT | Performed by: DERMATOLOGY

## 2024-04-25 DIAGNOSIS — Z79.899 HIGH RISK MEDICATION USE: ICD-10-CM

## 2024-04-25 DIAGNOSIS — L40.9 PSORIASIS: Primary | ICD-10-CM

## 2024-04-25 RX ORDER — ACITRETIN 10 MG/1
10 CAPSULE ORAL
Qty: 30 CAPSULE | Refills: 0 | Status: SHIPPED | OUTPATIENT
Start: 2024-04-25 | End: 2024-05-25

## 2024-04-26 ENCOUNTER — TELEPHONE (OUTPATIENT)
Dept: DERMATOLOGY | Facility: CLINIC | Age: 64
End: 2024-04-26
Payer: MEDICARE

## 2024-04-26 NOTE — TELEPHONE ENCOUNTER
----- Message from Geremias Morales MD sent at 4/25/2024  3:21 PM CDT -----  Sched fasting labs in 3 wks  ----- Message -----  From: [facility]  Sent: 4/24/2024   3:57 PM CDT  To: Geremias Morales MD

## 2025-04-02 ENCOUNTER — LAB VISIT (OUTPATIENT)
Dept: LAB | Facility: HOSPITAL | Age: 65
End: 2025-04-02
Attending: NURSE PRACTITIONER
Payer: MEDICARE

## 2025-04-02 ENCOUNTER — TELEPHONE (OUTPATIENT)
Dept: DERMATOLOGY | Facility: CLINIC | Age: 65
End: 2025-04-02
Payer: MEDICARE

## 2025-04-02 ENCOUNTER — OFFICE VISIT (OUTPATIENT)
Dept: PRIMARY CARE CLINIC | Facility: CLINIC | Age: 65
End: 2025-04-02
Payer: MEDICARE

## 2025-04-02 VITALS
SYSTOLIC BLOOD PRESSURE: 166 MMHG | HEART RATE: 78 BPM | WEIGHT: 212.94 LBS | DIASTOLIC BLOOD PRESSURE: 100 MMHG | HEIGHT: 74 IN | OXYGEN SATURATION: 98 % | BODY MASS INDEX: 27.33 KG/M2

## 2025-04-02 DIAGNOSIS — Z00.00 ROUTINE MEDICAL EXAM: ICD-10-CM

## 2025-04-02 DIAGNOSIS — R53.83 FATIGUE, UNSPECIFIED TYPE: ICD-10-CM

## 2025-04-02 DIAGNOSIS — Z12.5 PROSTATE CANCER SCREENING: ICD-10-CM

## 2025-04-02 DIAGNOSIS — E78.2 MIXED DYSLIPIDEMIA: ICD-10-CM

## 2025-04-02 DIAGNOSIS — Z00.00 ROUTINE MEDICAL EXAM: Primary | ICD-10-CM

## 2025-04-02 DIAGNOSIS — Z12.11 COLON CANCER SCREENING: ICD-10-CM

## 2025-04-02 DIAGNOSIS — R73.03 PRE-DIABETES: ICD-10-CM

## 2025-04-02 LAB
ABSOLUTE EOSINOPHIL (OHS): 0.06 K/UL
ABSOLUTE MONOCYTE (OHS): 0.57 K/UL (ref 0.3–1)
ABSOLUTE NEUTROPHIL COUNT (OHS): 4.57 K/UL (ref 1.8–7.7)
ALBUMIN SERPL BCP-MCNC: 4.4 G/DL (ref 3.5–5.2)
ALP SERPL-CCNC: 90 UNIT/L (ref 40–150)
ALT SERPL W/O P-5'-P-CCNC: 24 UNIT/L (ref 10–44)
ANION GAP (OHS): 13 MMOL/L (ref 8–16)
AST SERPL-CCNC: 31 UNIT/L (ref 11–45)
BASOPHILS # BLD AUTO: 0.06 K/UL
BASOPHILS NFR BLD AUTO: 0.9 %
BILIRUB SERPL-MCNC: 0.9 MG/DL (ref 0.1–1)
BUN SERPL-MCNC: 9 MG/DL (ref 8–23)
CALCIUM SERPL-MCNC: 9.7 MG/DL (ref 8.7–10.5)
CHLORIDE SERPL-SCNC: 105 MMOL/L (ref 95–110)
CHOLEST SERPL-MCNC: 169 MG/DL (ref 120–199)
CHOLEST/HDLC SERPL: 3.6 {RATIO} (ref 2–5)
CO2 SERPL-SCNC: 24 MMOL/L (ref 23–29)
CREAT SERPL-MCNC: 1.2 MG/DL (ref 0.5–1.4)
EAG (OHS): 103 MG/DL (ref 68–131)
ERYTHROCYTE [DISTWIDTH] IN BLOOD BY AUTOMATED COUNT: 12.7 % (ref 11.5–14.5)
GFR SERPLBLD CREATININE-BSD FMLA CKD-EPI: >60 ML/MIN/1.73/M2
GLUCOSE SERPL-MCNC: 96 MG/DL (ref 70–110)
HBA1C MFR BLD: 5.2 % (ref 4–5.6)
HCT VFR BLD AUTO: 41.7 % (ref 40–54)
HDLC SERPL-MCNC: 47 MG/DL (ref 40–75)
HDLC SERPL: 27.8 % (ref 20–50)
HGB BLD-MCNC: 14.2 GM/DL (ref 14–18)
IMM GRANULOCYTES # BLD AUTO: 0.02 K/UL (ref 0–0.04)
IMM GRANULOCYTES NFR BLD AUTO: 0.3 % (ref 0–0.5)
LDLC SERPL CALC-MCNC: 108 MG/DL (ref 63–159)
LYMPHOCYTES # BLD AUTO: 1.46 K/UL (ref 1–4.8)
MCH RBC QN AUTO: 30.6 PG (ref 27–31)
MCHC RBC AUTO-ENTMCNC: 34.1 G/DL (ref 32–36)
MCV RBC AUTO: 90 FL (ref 82–98)
NONHDLC SERPL-MCNC: 122 MG/DL
NUCLEATED RBC (/100WBC) (OHS): 0 /100 WBC
PLATELET # BLD AUTO: 209 K/UL (ref 150–450)
PMV BLD AUTO: 10.4 FL (ref 9.2–12.9)
POTASSIUM SERPL-SCNC: 4.7 MMOL/L (ref 3.5–5.1)
PROT SERPL-MCNC: 8.4 GM/DL (ref 6–8.4)
PSA SERPL-MCNC: 1.73 NG/ML
RBC # BLD AUTO: 4.64 M/UL (ref 4.6–6.2)
RELATIVE EOSINOPHIL (OHS): 0.9 %
RELATIVE LYMPHOCYTE (OHS): 21.7 % (ref 18–48)
RELATIVE MONOCYTE (OHS): 8.5 % (ref 4–15)
RELATIVE NEUTROPHIL (OHS): 67.7 % (ref 38–73)
SODIUM SERPL-SCNC: 142 MMOL/L (ref 136–145)
T4 FREE SERPL-MCNC: 0.88 NG/DL (ref 0.71–1.51)
TRIGL SERPL-MCNC: 70 MG/DL (ref 30–150)
TSH SERPL-ACNC: 0.8 UIU/ML (ref 0.4–4)
WBC # BLD AUTO: 6.74 K/UL (ref 3.9–12.7)

## 2025-04-02 PROCEDURE — 84439 ASSAY OF FREE THYROXINE: CPT

## 2025-04-02 PROCEDURE — 84443 ASSAY THYROID STIM HORMONE: CPT

## 2025-04-02 PROCEDURE — 3080F DIAST BP >= 90 MM HG: CPT | Mod: CPTII,S$GLB,, | Performed by: NURSE PRACTITIONER

## 2025-04-02 PROCEDURE — 99396 PREV VISIT EST AGE 40-64: CPT | Mod: S$GLB,,, | Performed by: NURSE PRACTITIONER

## 2025-04-02 PROCEDURE — 80061 LIPID PANEL: CPT

## 2025-04-02 PROCEDURE — 36415 COLL VENOUS BLD VENIPUNCTURE: CPT | Mod: PN

## 2025-04-02 PROCEDURE — 83036 HEMOGLOBIN GLYCOSYLATED A1C: CPT

## 2025-04-02 PROCEDURE — 1160F RVW MEDS BY RX/DR IN RCRD: CPT | Mod: CPTII,S$GLB,, | Performed by: NURSE PRACTITIONER

## 2025-04-02 PROCEDURE — 84153 ASSAY OF PSA TOTAL: CPT

## 2025-04-02 PROCEDURE — 80053 COMPREHEN METABOLIC PANEL: CPT

## 2025-04-02 PROCEDURE — 3008F BODY MASS INDEX DOCD: CPT | Mod: CPTII,S$GLB,, | Performed by: NURSE PRACTITIONER

## 2025-04-02 PROCEDURE — 99999 PR PBB SHADOW E&M-EST. PATIENT-LVL IV: CPT | Mod: PBBFAC,,, | Performed by: NURSE PRACTITIONER

## 2025-04-02 PROCEDURE — 1159F MED LIST DOCD IN RCRD: CPT | Mod: CPTII,S$GLB,, | Performed by: NURSE PRACTITIONER

## 2025-04-02 PROCEDURE — 4010F ACE/ARB THERAPY RXD/TAKEN: CPT | Mod: CPTII,S$GLB,, | Performed by: NURSE PRACTITIONER

## 2025-04-02 PROCEDURE — 3077F SYST BP >= 140 MM HG: CPT | Mod: CPTII,S$GLB,, | Performed by: NURSE PRACTITIONER

## 2025-04-02 PROCEDURE — 85025 COMPLETE CBC W/AUTO DIFF WBC: CPT

## 2025-04-02 RX ORDER — VALSARTAN 320 MG/1
320 TABLET ORAL DAILY
Qty: 90 TABLET | Refills: 3 | Status: SHIPPED | OUTPATIENT
Start: 2025-04-02 | End: 2026-04-02
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
## 2025-04-02 NOTE — TELEPHONE ENCOUNTER
----- Message from Tiffanie sent at 4/2/2025  8:55 AM CDT -----  Regarding: Appt  Contact: Pt  [phone]  Pt was seeing Dr Morales for psoriasis; pt states his psoriasis is not getting better and want to schedule a appr; no dates avail please call

## 2025-04-07 ENCOUNTER — LAB VISIT (OUTPATIENT)
Dept: LAB | Facility: HOSPITAL | Age: 65
End: 2025-04-07
Attending: DERMATOLOGY
Payer: MEDICARE

## 2025-04-07 ENCOUNTER — OFFICE VISIT (OUTPATIENT)
Dept: DERMATOLOGY | Facility: CLINIC | Age: 65
End: 2025-04-07
Payer: MEDICARE

## 2025-04-07 DIAGNOSIS — L40.9 PSORIASIS: Primary | ICD-10-CM

## 2025-04-07 DIAGNOSIS — Z79.899 HIGH RISK MEDICATION USE: ICD-10-CM

## 2025-04-07 DIAGNOSIS — L40.9 PSORIASIS: ICD-10-CM

## 2025-04-07 LAB
ALBUMIN SERPL BCP-MCNC: 4.2 G/DL (ref 3.5–5.2)
ALP SERPL-CCNC: 75 UNIT/L (ref 40–150)
ALT SERPL W/O P-5'-P-CCNC: 29 UNIT/L (ref 10–44)
AST SERPL-CCNC: 32 UNIT/L (ref 11–45)
BILIRUB DIRECT SERPL-MCNC: 0.3 MG/DL (ref 0.1–0.3)
BILIRUB SERPL-MCNC: 0.8 MG/DL (ref 0.1–1)
CHOLEST SERPL-MCNC: 121 MG/DL (ref 120–199)
CHOLEST/HDLC SERPL: 3.9 {RATIO} (ref 2–5)
HBV SURFACE AG SERPL QL IA: NORMAL
HCV AB SERPL QL IA: NORMAL
HDLC SERPL-MCNC: 31 MG/DL (ref 40–75)
HDLC SERPL: 25.6 % (ref 20–50)
LDLC SERPL CALC-MCNC: 73.2 MG/DL (ref 63–159)
NONHDLC SERPL-MCNC: 90 MG/DL
PROT SERPL-MCNC: 8.1 GM/DL (ref 6–8.4)
TRIGL SERPL-MCNC: 84 MG/DL (ref 30–150)

## 2025-04-07 PROCEDURE — 80076 HEPATIC FUNCTION PANEL: CPT

## 2025-04-07 PROCEDURE — 86803 HEPATITIS C AB TEST: CPT

## 2025-04-07 PROCEDURE — 87340 HEPATITIS B SURFACE AG IA: CPT

## 2025-04-07 PROCEDURE — 86480 TB TEST CELL IMMUN MEASURE: CPT

## 2025-04-07 PROCEDURE — 36415 COLL VENOUS BLD VENIPUNCTURE: CPT | Mod: PO

## 2025-04-07 PROCEDURE — 80061 LIPID PANEL: CPT

## 2025-04-07 PROCEDURE — 99999 PR PBB SHADOW E&M-EST. PATIENT-LVL III: CPT | Mod: PBBFAC,,, | Performed by: DERMATOLOGY

## 2025-04-07 RX ORDER — TRIAMCINOLONE ACETONIDE 1 MG/G
CREAM TOPICAL
Qty: 454 G | Refills: 3 | Status: SHIPPED | OUTPATIENT
Start: 2025-04-07

## 2025-04-07 RX ORDER — KETOCONAZOLE 20 MG/ML
SHAMPOO, SUSPENSION TOPICAL
Qty: 120 ML | Refills: 6 | Status: SHIPPED | OUTPATIENT
Start: 2025-04-07

## 2025-04-07 NOTE — PROGRESS NOTES
"  Subjective:      Patient ID:  Gilson Tan is a 64 y.o. male who presents for   Chief Complaint   Patient presents with    Psoriasis     Flared     Follow up psoriasis, see previous notes Dr Morales.  He decided against MTX, now has flared using clobetasol and ketoconizole shampoo.      "clobetasoL (TEMOVATE) 0.05 % cream; Apply topically 2 (two) times daily. To psoriasis spots.  Combine with patient's calcipotriene cream as directed.Stop using steroid topical when skin is smooth and non itchy.  Do not treat dark or red coloring.  Dispense: 60 g; Refill: 1  Pt reports not much change with otezla.or humira   Pt doesn't want uvb or immunosuppressives."  Did not start the soriatane last year.         Psoriasis - Initial  Affected locations: right lower leg, left lower leg, right arm, left arm and left ear  Signs / symptoms: itching      Review of Systems   Constitutional:  Negative for fever, chills, weight loss, weight gain, fatigue and malaise.   Skin:  Positive for itching and wears hat. Negative for rash, daily sunscreen use and activity-related sunscreen use.   Hematologic/Lymphatic: Does not bruise/bleed easily.       Objective:   Physical Exam   Constitutional: He appears well-developed and well-nourished.   Neurological: He is alert and oriented to person, place, and time.   Psychiatric: He has a normal mood and affect.   Skin:   Areas Examined (abnormalities noted in diagram):   Scalp / Hair Palpated and Inspected  Neck Inspection Performed  RUE Inspected  LUE Inspection Performed  RLE Inspected  LLE Inspection Performed                 Diagram Legend     Erythematous scaling macule/papule c/w actinic keratosis       Vascular papule c/w angioma      Pigmented verrucoid papule/plaque c/w seborrheic keratosis      Yellow umbilicated papule c/w sebaceous hyperplasia      Irregularly shaped tan macule c/w lentigo     1-2 mm smooth white papules consistent with Milia      Movable subcutaneous cyst with punctum c/w " epidermal inclusion cyst      Subcutaneous movable cyst c/w pilar cyst      Firm pink to brown papule c/w dermatofibroma      Pedunculated fleshy papule(s) c/w skin tag(s)      Evenly pigmented macule c/w junctional nevus     Mildly variegated pigmented, slightly irregular-bordered macule c/w mildly atypical nevus      Flesh colored to evenly pigmented papule c/w intradermal nevus       Pink pearly papule/plaque c/w basal cell carcinoma      Erythematous hyperkeratotic cursted plaque c/w SCC      Surgical scar with no sign of skin cancer recurrence      Open and closed comedones      Inflammatory papules and pustules      Verrucoid papule consistent consistent with wart     Erythematous eczematous patches and plaques     Dystrophic onycholytic nail with subungual debris c/w onychomycosis     Umbilicated papule    Erythematous-base heme-crusted tan verrucoid plaque consistent with inflamed seborrheic keratosis     Erythematous Silvery Scaling Plaque c/w Psoriasis     See annotation      Assessment / Plan:        Psoriasis  Discussed  benefits and risks of Tremfya including but not limited to injection site reactions, increased risk of infection, and decreased tumor surveillance. Patient informed to discontinue Tremfya and notify our office if an infection develops.  Patient counseled to avoid live vaccines.  Brochure provided    Recent cmp and cbc normal  Need baseline , Hepatitis B, Hepatitis C, and quantiferon gold.       Start Tremfya at 100mg SQ on week 0 then week 4 then every other month    Will need CBC q 6 months. Will need Quantiferon gold annually.       -     Quantiferon Gold TB; Future; Expected date: 04/07/2025  -     Hepatitis C Antibody; Future; Expected date: 04/07/2025  -     Hepatitis B Surface Antigen; Future; Expected date: 04/07/2025      -     triamcinolone acetonide 0.1% (KENALOG) 0.1 % cream; Use bid  Dispense: 454 g; Refill: 3  -     ketoconazole (NIZORAL) 2 % shampoo; Apply topically every 7  days.  Dispense: 120 mL; Refill: 6        Dermeleve for pruritus

## 2025-04-08 ENCOUNTER — RESULTS FOLLOW-UP (OUTPATIENT)
Dept: DERMATOLOGY | Facility: CLINIC | Age: 65
End: 2025-04-08

## 2025-04-08 LAB
MITOGEN MINUS NIL (OHS): 9.97
NIL TB SYNCED (OHS): 0.03
QUANTIFERON GOLD INTERP (OHS): NEGATIVE
TB1 AG MINUS NIL (OHS): 0.09
TB2 AG MINUS NIL (OHS): 0.2

## 2025-04-10 ENCOUNTER — DOCUMENTATION ONLY (OUTPATIENT)
Dept: PRIMARY CARE CLINIC | Facility: CLINIC | Age: 65
End: 2025-04-10
Payer: MEDICARE

## 2025-04-10 DIAGNOSIS — N52.9 ERECTILE DYSFUNCTION, UNSPECIFIED ERECTILE DYSFUNCTION TYPE: Primary | ICD-10-CM

## 2025-04-10 RX ORDER — TADALAFIL 10 MG/1
10 TABLET ORAL DAILY PRN
Qty: 30 TABLET | Refills: 5 | Status: SHIPPED | OUTPATIENT
Start: 2025-04-10 | End: 2026-04-10

## 2025-04-10 NOTE — PROGRESS NOTES
Reviewed CBC, CMP, HGB A1c, lipid panel, thyroid levels with patient via telephone on 04/10/25 at 0800. All questions were answered to patient's satisfaction.

## 2025-05-01 ENCOUNTER — TELEPHONE (OUTPATIENT)
Dept: ENDOSCOPY | Facility: HOSPITAL | Age: 65
End: 2025-05-01

## 2025-05-01 ENCOUNTER — CLINICAL SUPPORT (OUTPATIENT)
Dept: ENDOSCOPY | Facility: HOSPITAL | Age: 65
End: 2025-05-01
Payer: MEDICARE

## 2025-05-01 VITALS — HEIGHT: 74 IN | BODY MASS INDEX: 26.69 KG/M2 | WEIGHT: 208 LBS

## 2025-05-01 DIAGNOSIS — Z12.11 COLON CANCER SCREENING: ICD-10-CM

## 2025-05-01 DIAGNOSIS — Z12.11 SPECIAL SCREENING FOR MALIGNANT NEOPLASMS, COLON: Primary | ICD-10-CM

## 2025-05-01 NOTE — TELEPHONE ENCOUNTER
Referral for procedure from PAT appointment      Spoke to patient to schedule procedure(s) Colonoscopy       Physician to perform procedure(s) Dr. GENOVEVA Julio  Date of Procedure (s) 5/20/25  Arrival Time 10:30 AM  Time of Procedure(s) 11:30 AM   Location of Procedure(s) [place] [address] Floor  Type of Rx Prep sent to patient: PEG  Instructions provided to patient via [facility]    Patient was informed on the following information and verbalized understanding. Screening questionnaire reviewed with patient and complete. If procedure requires anesthesia, a responsible adult needs to be present to accompany the patient home, patient cannot drive after receiving anesthesia. Appointment details are tentative, especially check-in time. Patient will receive a prep-op call 7 days prior to confirm check-in time for procedure. If applicable the patient should contact their pharmacy to verify Rx for procedure prep is ready for pick-up. Patient was advised to call the scheduling department at [phone] if pharmacy states no Rx is available. Patient was advised to call the endoscopy scheduling department if any questions or concerns arise.      [facility] Scheduling Department

## 2025-05-01 NOTE — TELEPHONE ENCOUNTER
Colonoscopy Procedure Prep Instructions    Date of procedure: 5/20/25 Arrive at: 10:30 AM    Location of Department:   [facility] [address], [place] [address]  Take the Elevators to 2nd Floor Endoscopy Procedural Area    As soon as possible:   your prep from pharmacy and over the counter DULCOLAX LAXATIVE TABLETS            On the day before your procedure   What You CAN do:   You may have clear liquids ONLY-see below for list.     Liquids That Are OK to Drink:   Water  Sports drinks (Gatorade, Power-Aid)  Coffee or tea (no cream or nondairy creamer)  Clear juices without pulp (apple, white grape)  Gelatin desserts (no fruit or toppings)  Clear soda (sprite, coke, ginger ale)  Chicken broth (until 12 midnight the night before procedure)    What You CANNOT do:   Do not EAT solid food, drink milk or anything   colored red.  Do not drink alcohol.  Do not take oral medications within 1 hour of starting   each dose of prep.  No gum chewing or candy morning of procedure                       Note:   (Please disregard the insert instructions from pharmacy).  PEG Bowel Prep is indicated for cleansing of the colon as a preparation for colonoscopy in adults.   Be sure to tell your doctor about all the medicines you take, including prescription and non-prescription medicines, vitamins, and herbal supplements. PEG Bowel Prep may affect how other medicines work.  Medication taken by mouth may not be absorbed properly when taken within 1 hour before the start of each dose of PEG Bowel Prep.    It is not uncommon to experience some abdominal cramping, nausea and/or vomiting when taking the prep. If you have nausea and/or vomiting while taking the prep, stop drinking for 20 to 30 minutes then continue.      How to take prep:    PEG Bowel Prep is a (2-day) prep.    One (1) bottle of prep are required for a complete preparation for colonoscopy. Dilute the solution concentrate as directed  prior to use. You must drink water with each dose of prep, and additional water after each dose.    DOSE 1--Day Before Colonoscopy 5/19/25     Drink at least 6 to 8 glasses of clear liquids from time you wake up until you begin your prep and then continue until bedtime to avoid dehydration.     12:00 pm (NOON) Mix your entire container of prep with lukewarm water and refrigerate. Take four (4) Dulcolax (Bisacodyl) tablets with at least 8 ounces or more of clear liquids.       6:00 pm:    You must complete Steps 1 and 2 below before going to bed:    Step 1-Drink half the liquid in the container within one (1) hour.   Step 2-Refrigerate the remaining half of the liquid for dose 2. See below when to begin this step.                       IMPORTANT: If you experience preparation-related symptoms (for example, nausea, bloating, or cramping), stop, or slow the rate of drinking the additional water until your symptoms decrease.    DOSE 2--Day of the Colonoscopy 5/20/25 at 2-3 AM.    For this dose, repeat Step 1 shown above using the remaining half of the liquid prep.   You may continue drinking water/clear liquids until   4 hours before your colonoscopy or as directed by the scheduling nurse  7:30 AM.      For information about your procedure, two (2) things to view prior to colonoscopy:  Please watch this informational video. It is important to watch this animated consent video prior to your arrival. If you haven't watched the video prior to arriving, you are required to watch it during admission which can causes delays.    Options for viewing:   Using a keyboard:  press and hold the control tab (Ctrl) and left mouse click to follow links.           Colonoscopy Instructional Video                                                                                   OR    Type link address into your web browser's address bar:  [email or web address]      Educational Booklet with  pictures:      Colonoscopy Prep - Liquid      Comments:           IMPORTANT INFORMATION TO KNOW BEFORE YOUR PROCEDURE    [facility] 2nd Floor         If your procedure requires the administration of anesthesia, it is necessary for a responsible adult to drive you home. (Medical Transportation, Uber, Lyft, Taxi, etc. may ONLY be used if a responsible adult is present to accompany you home.  The responsible adult CAN'T be the  of the service).      person must be available to return to pick you up within 15 minutes of being notified of discharge.       Please bring a picture ID, insurance card, & copayment      Take Medications as directed below:      If you begin taking any blood thinning medications, injectable weight loss/diabetes medications (other than insulin) , or Adipex (Phentermine) please contact the endoscopy scheduling department listed below as soon as possible.    If you are diabetic see the attached instruction sheet regarding your medication.     If you take HEART, BLOOD PRESSURE, SEIZURE, PAIN, LUNG (including inhalers/nebulizers), ANTI-REJECTION (transplant patients), or PSYCHIATRIC medications, please take at your regular times with a sip of water or as directed by the scheduling nurse.     Important contact information:    Endoscopy [phone] Hours of operation Monday-Friday 8:00-4:30pm.    Questions about insurance or financial obligations call [phone] or [phone].    If you have questions regarding the prep or need to reschedule, please call [phone]. After hours questions requiring immediate assistance, contact [facility] nurse line at [phone] or [phone].   NOTE:     On occasion, unforeseen circumstances may cause a delay in your procedure start time. We respect your time and appreciate your patience during these circumstances.      Comments:

## 2025-05-05 ENCOUNTER — HOSPITAL ENCOUNTER (OUTPATIENT)
Dept: RADIOLOGY | Facility: HOSPITAL | Age: 65
Discharge: HOME | End: 2025-05-05
Attending: NURSE PRACTITIONER
Payer: MEDICARE

## 2025-05-05 ENCOUNTER — OFFICE VISIT (OUTPATIENT)
Dept: PRIMARY CARE CLINIC | Facility: CLINIC | Age: 65
End: 2025-05-05
Payer: MEDICARE

## 2025-05-05 VITALS
WEIGHT: 222.69 LBS | HEART RATE: 78 BPM | DIASTOLIC BLOOD PRESSURE: 68 MMHG | OXYGEN SATURATION: 96 % | BODY MASS INDEX: 28.58 KG/M2 | HEIGHT: 74 IN | SYSTOLIC BLOOD PRESSURE: 132 MMHG

## 2025-05-05 DIAGNOSIS — Z72.0 TOBACCO USER: ICD-10-CM

## 2025-05-05 DIAGNOSIS — E78.2 MIXED DYSLIPIDEMIA: ICD-10-CM

## 2025-05-05 DIAGNOSIS — N52.9 ERECTILE DYSFUNCTION, UNSPECIFIED ERECTILE DYSFUNCTION TYPE: ICD-10-CM

## 2025-05-05 DIAGNOSIS — M79.604 RIGHT LEG PAIN: ICD-10-CM

## 2025-05-05 DIAGNOSIS — L02.92 BOIL: ICD-10-CM

## 2025-05-05 DIAGNOSIS — M79.604 RIGHT LEG PAIN: Primary | ICD-10-CM

## 2025-05-05 DIAGNOSIS — I10 PRIMARY HYPERTENSION: ICD-10-CM

## 2025-05-05 PROCEDURE — 73562 X-RAY EXAM OF KNEE 3: CPT | Mod: TC,PN,RT

## 2025-05-05 PROCEDURE — 3075F SYST BP GE 130 - 139MM HG: CPT | Mod: CPTII,S$GLB,, | Performed by: NURSE PRACTITIONER

## 2025-05-05 PROCEDURE — 73502 X-RAY EXAM HIP UNI 2-3 VIEWS: CPT | Mod: 26,RT,, | Performed by: INTERNAL MEDICINE

## 2025-05-05 PROCEDURE — 73502 X-RAY EXAM HIP UNI 2-3 VIEWS: CPT | Mod: TC,PN,RT

## 2025-05-05 PROCEDURE — 99214 OFFICE O/P EST MOD 30 MIN: CPT | Mod: S$GLB,,, | Performed by: NURSE PRACTITIONER

## 2025-05-05 PROCEDURE — 3044F HG A1C LEVEL LT 7.0%: CPT | Mod: CPTII,S$GLB,, | Performed by: NURSE PRACTITIONER

## 2025-05-05 PROCEDURE — 1159F MED LIST DOCD IN RCRD: CPT | Mod: CPTII,S$GLB,, | Performed by: NURSE PRACTITIONER

## 2025-05-05 PROCEDURE — 4010F ACE/ARB THERAPY RXD/TAKEN: CPT | Mod: CPTII,S$GLB,, | Performed by: NURSE PRACTITIONER

## 2025-05-05 PROCEDURE — 3008F BODY MASS INDEX DOCD: CPT | Mod: CPTII,S$GLB,, | Performed by: NURSE PRACTITIONER

## 2025-05-05 PROCEDURE — 3078F DIAST BP <80 MM HG: CPT | Mod: CPTII,S$GLB,, | Performed by: NURSE PRACTITIONER

## 2025-05-05 PROCEDURE — 1160F RVW MEDS BY RX/DR IN RCRD: CPT | Mod: CPTII,S$GLB,, | Performed by: NURSE PRACTITIONER

## 2025-05-05 PROCEDURE — 73562 X-RAY EXAM OF KNEE 3: CPT | Mod: 26,RT,, | Performed by: INTERNAL MEDICINE

## 2025-05-05 PROCEDURE — 99999 PR PBB SHADOW E&M-EST. PATIENT-LVL V: CPT | Mod: PBBFAC,,, | Performed by: NURSE PRACTITIONER

## 2025-05-05 RX ORDER — MUPIROCIN 20 MG/G
OINTMENT TOPICAL 3 TIMES DAILY
Qty: 22 G | Refills: 0 | Status: SHIPPED | OUTPATIENT
Start: 2025-05-05

## 2025-05-05 RX ORDER — SILDENAFIL 25 MG/1
25 TABLET, FILM COATED ORAL DAILY PRN
Qty: 30 TABLET | Refills: 1 | Status: SHIPPED | OUTPATIENT
Start: 2025-05-05 | End: 2026-05-05

## 2025-05-05 RX ORDER — CEPHALEXIN 500 MG/1
500 CAPSULE ORAL EVERY 12 HOURS
Qty: 14 CAPSULE | Refills: 0 | Status: SHIPPED | OUTPATIENT
Start: 2025-05-05

## 2025-05-05 RX ORDER — ASPIRIN 81 MG/1
81 TABLET ORAL DAILY
Qty: 90 TABLET | Refills: 3 | Status: SHIPPED | OUTPATIENT
Start: 2025-05-05

## 2025-05-05 RX ORDER — VALSARTAN 320 MG/1
320 TABLET ORAL DAILY
Qty: 90 TABLET | Refills: 3 | Status: SHIPPED | OUTPATIENT
Start: 2025-05-05 | End: 2026-05-05

## 2025-05-05 RX ORDER — ROSUVASTATIN CALCIUM 20 MG/1
20 TABLET, COATED ORAL NIGHTLY
Qty: 90 TABLET | Refills: 3 | Status: SHIPPED | OUTPATIENT
Start: 2025-05-05

## 2025-05-05 NOTE — PROGRESS NOTES
[facility] Note    Chief Complaint      Chief Complaint   Patient presents with    Follow-up      HTN       History of Present Illness      Gilson Tan is a 64 y.o. male who presents today for   Chief Complaint   Patient presents with    Follow-up      HTN         CHIEF COMPLAINT:  Patient presents with hip pain and a skin issue on his face.    HPI:  Patient reports right posterior thigh pain that recently started, localized from hip area and radiating down to right knee. The pain is most noticeable when he sits up, as he is during the visit. He has no discomfort while resting or lying down, but has discomfort when sitting up or driving. This is the first time he has had this type of pain in this area. He denies any pain in his knee or the back of his legs. The pain affects his ability to tie his shoes.    He has a boil type area to left side of his lower lip, apparently related to shaving. It feels like it is about to rupture, and he has been manipulating it. He has been trying to get an appointment with a dermatologist for this issue. He has attempted to self-treat by applying hot water, as suggested by another individual. The cyst has not resolved.    He has successfully quit smoking and drinking since his last visit, approximately 2 months ago. He reports no cravings for cigarettes or beer but expresses interest in obtaining patches to manage any potential cravings. He has been avoiding situations where he might be tempted to drink or smoke.    MEDICAL HISTORY:  Patient has a history of suspected arthritis in the hip area and erectile dysfunction.      ROS:  General: -fever, -chills, -fatigue, -weight gain, -weight loss  Eyes: -vision changes, -redness, -discharge  ENT: -ear pain, -nasal congestion, -sore throat  Cardiovascular: -chest pain, -palpitations, -lower extremity edema  Respiratory: -cough, -shortness of breath  Gastrointestinal: -abdominal pain, -nausea, -vomiting, -diarrhea,  "-constipation, -blood in stool  Genitourinary: -dysuria, -hematuria, -frequency  Musculoskeletal: +joint pain, -muscle pain, +pain with movement  Skin: -rash, +lesion  Neurological: -headache, -dizziness, -numbness, -tingling  Psychiatric: -anxiety, -depression, -sleep difficulty    Follow-up               Family History:  family history includes Alzheimer's disease in his paternal grandfather; Colon cancer in his father; Diabetes in his sister; HIV in his sister; Heart disease in his brother; Hypertension in his maternal grandfather, mother, and sister; No Known Problems in his brother, brother, daughter, maternal grandmother, paternal grandmother, sister, sister, and sister; Prostate cancer in his paternal uncle; Seizures in his brother; Sickle cell anemia in his son; Thyroid disease in his sister.   Family history was reviewed with patient.     Medications:  Encounter Medications[1]    Allergies:  Review of patient's allergies indicates:  No Known Allergies    Health Maintenance:  Health Maintenance   Topic Date Due    Colorectal Cancer Screening  Never done    Hemoglobin A1c (Diabetic Prevention Screening)  04/02/2028    Lipid Panel  04/07/2030    TETANUS VACCINE  02/17/2033    Hepatitis C Screening  Completed    Shingles Vaccine  Completed    Influenza Vaccine  Completed    HIV Screening  Completed    COVID-19 Vaccine  Completed    RSV Vaccine (Age 60+ and Pregnant patients)  Completed    Pneumococcal Vaccines (Age 50+)  Completed     Health Maintenance Topics with due status: Not Due       Topic Last Completion Date    TETANUS VACCINE 02/17/2023    Hemoglobin A1c (Diabetic Prevention Screening) 04/02/2025    Lipid Panel 04/07/2025       Physical Exam      Vital Signs  Pulse: 78  SpO2: 96 %  BP: 132/68  BP Location: Right arm  Patient Position: Sitting  Pain Score:   6  Pain Loc: Leg  Height and Weight  Height: 6' 2" (188 cm)  Weight: 101 kg (222 lb 10.6 oz)  BSA (Calculated - sq m): 2.3 sq meters  BMI " (Calculated): 28.6  Weight in (lb) to have BMI = 25: 194.3]    Physical Exam  Constitutional:       Appearance: Normal appearance. He is normal weight.   HENT:      Head: Normocephalic and atraumatic.      Nose: Nose normal.      Mouth/Throat:      Mouth: Mucous membranes are moist.      Pharynx: Oropharynx is clear.   Eyes:      Extraocular Movements: Extraocular movements intact.      Conjunctiva/sclera: Conjunctivae normal.      Pupils: Pupils are equal, round, and reactive to light.   Cardiovascular:      Rate and Rhythm: Normal rate and regular rhythm.      Pulses: Normal pulses.      Heart sounds: Normal heart sounds.   Pulmonary:      Effort: Pulmonary effort is normal.      Breath sounds: Normal breath sounds.   Musculoskeletal:         General: Normal range of motion.      Cervical back: Normal range of motion and neck supple.   Skin:     General: Skin is warm and dry.      Capillary Refill: Capillary refill takes less than 2 seconds.   Neurological:      General: No focal deficit present.      Mental Status: He is alert and oriented to person, place, and time. Mental status is at baseline.   Psychiatric:         Mood and Affect: Mood normal.         Behavior: Behavior normal.         Thought Content: Thought content normal.         Judgment: Judgment normal.            Assessment/Plan     Gilson Tan is a 64 y.o.male with:    Right leg pain  -     X-Ray Hip 2 or 3 views Right with Pelvis when performed; Future; Expected date: 05/05/2025  -     PT evaluate and treat; Future  -     X-Ray Knee 3 View Right; Future; Expected date: 05/05/2025    Erectile dysfunction, unspecified erectile dysfunction type  -     sildenafiL (VIAGRA) 25 MG tablet; Take 1 tablet (25 mg total) by mouth daily as needed for Erectile Dysfunction.  Dispense: 30 tablet; Refill: 1    Tobacco user  - Patient reports he stopped smoking cigarettes 1 month ago.  -     Ambulatory referral/consult to Smoking Cessation Program; Future; Expected  date: 2025    Mixed dyslipidemia  -     rosuvastatin (CRESTOR) 20 MG tablet; Take 1 tablet (20 mg total) by mouth every evening.  Dispense: 90 tablet; Refill: 3    Primary hypertension  -     aspirin (ECOTRIN) 81 MG EC tablet; Take 1 tablet (81 mg total) by mouth once daily.  Dispense: 90 tablet; Refill: 3  -     valsartan (DIOVAN) 320 MG tablet; Take 1 tablet (320 mg total) by mouth once daily.  Dispense: 90 tablet; Refill: 3    Boil  -     cephALEXin (KEFLEX) 500 MG capsule; Take 1 capsule (500 mg total) by mouth every 12 (twelve) hours.  Dispense: 14 capsule; Refill: 0  -     mupirocin (BACTROBAN) 2 % ointment; Apply topically 3 (three) times daily.  Dispense: 22 g; Refill: 0        As above, continue current medications and maintain follow up with specialists.  Return to clinic as needed.    Greater than 50% of visit was spent face to face with patient.  All questions were answered to patient's satisfaction.          Karen L Spencer, NP-C [facility]                     [1]   Outpatient Encounter Medications as of 2025   Medication Sig Dispense Refill    ketoconazole (NIZORAL) 2 % shampoo Apply topically every 7 days. 120 mL 6    triamcinolone acetonide 0.1% (KENALOG) 0.1 % cream Use bid 454 g 3    [DISCONTINUED] aspirin (ECOTRIN) 81 MG EC tablet Take 1 tablet (81 mg total) by mouth once daily. 90 tablet 3    [DISCONTINUED] rosuvastatin (CRESTOR) 20 MG tablet TAKE 1 TABLET(20 MG) BY MOUTH EVERY DAY 30 tablet 0    [DISCONTINUED] valsartan (DIOVAN) 320 MG tablet Take 1 tablet (320 mg total) by mouth once daily. 90 tablet 3    aspirin (ECOTRIN) 81 MG EC tablet Take 1 tablet (81 mg total) by mouth once daily. 90 tablet 3    cephALEXin (KEFLEX) 500 MG capsule Take 1 capsule (500 mg total) by mouth every 12 (twelve) hours. 14 capsule 0    mupirocin (BACTROBAN) 2 % ointment Apply topically 3 (three) times daily. 22 g 0    [] polyethylene glycol (COLYTE) 240-22.72-6.72 -5.84 gram SolR Take  4,000 mLs by mouth once. for 1 dose 4000 mL 0    rosuvastatin (CRESTOR) 20 MG tablet Take 1 tablet (20 mg total) by mouth every evening. 90 tablet 3    sildenafiL (VIAGRA) 25 MG tablet Take 1 tablet (25 mg total) by mouth daily as needed for Erectile Dysfunction. 30 tablet 1    valsartan (DIOVAN) 320 MG tablet Take 1 tablet (320 mg total) by mouth once daily. 90 tablet 3    [DISCONTINUED] acitretin (SORIATANE) 10 MG capsule Take 1 capsule (10 mg total) by mouth before breakfast. 30 capsule 0    [DISCONTINUED] RSVPreF3 antigen-AS01E, PF, (AREXVY, PF,) 120 mcg/0.5 mL SusR vaccine Inject into the muscle. (Patient not taking: Reported on 5/5/2025) 1 each 0    [DISCONTINUED] tadalafiL (CIALIS) 10 MG tablet Take 1 tablet (10 mg total) by mouth daily as needed for Erectile Dysfunction. (Patient not taking: Reported on 5/5/2025) 30 tablet 5    [DISCONTINUED] valsartan-hydrochlorothiazide (DIOVAN-HCT) 160-12.5 mg per tablet TAKE 1 TABLET BY MOUTH EVERY DAY IN THE MORNING FOR HIGH BLOOD PRESSURE (Patient not taking: Reported on 5/5/2025) 90 tablet 0     No facility-administered encounter medications on file as of 5/5/2025.

## 2025-05-06 ENCOUNTER — DOCUMENTATION ONLY (OUTPATIENT)
Dept: PRIMARY CARE CLINIC | Facility: CLINIC | Age: 65
End: 2025-05-06
Payer: MEDICARE

## 2025-05-06 NOTE — PROGRESS NOTES
Left message on patient's voicemail [phone], with results of xrays of right knee, right hip, and lower back. Advised to continue follow up with referral of physical therapy.

## 2025-05-19 ENCOUNTER — ANESTHESIA EVENT (OUTPATIENT)
Dept: ENDOSCOPY | Facility: HOSPITAL | Age: 65
End: 2025-05-19
Payer: MEDICARE

## 2025-05-19 ENCOUNTER — PATIENT MESSAGE (OUTPATIENT)
Dept: ENDOSCOPY | Facility: HOSPITAL | Age: 65
End: 2025-05-19
Payer: MEDICARE

## 2025-05-19 ENCOUNTER — TELEPHONE (OUTPATIENT)
Dept: ENDOSCOPY | Facility: HOSPITAL | Age: 65
End: 2025-05-19
Payer: MEDICARE

## 2025-05-19 NOTE — ANESTHESIA PREPROCEDURE EVALUATION
[facility]  Anesthesia Pre-Operative Evaluation       Patient Name: Gilson Tan  YOB: 1960  MRN: [id number]  [facility]: [id number]      Code Status: No Order   Date of Procedure: 5/20/2025  Anesthesia: Choice Procedure: Procedure(s) (LRB):  COLONOSCOPY, SCREENING, LOW RISK PATIENT (N/A)  Pre-Operative Diagnosis: Colon cancer screening [Z12.11]  Proceduralist: Surgeons and Role:     * Nelda Lion MD - Primary        SUBJECTIVE:   Gilson Tan is a 64 y.o. male who  has a past medical history of Blindness of right eye, Hyperlipidemia, Hypertension, and Psoriasis. No notes on file    No current facility-administered medications for this encounter.       he has a current medication list which includes the following long-term medication(s): aspirin, ketoconazole, rosuvastatin, sildenafil, triamcinolone acetonide 0.1%, and valsartan.   ALLERGIES:   Review of patient's allergies indicates:  No Known Allergies  LDA:          Lines/Drains/Airways       None                 MEDICATIONS:     Antibiotics (From admission, onward)      None          VTE Risk Mitigation (From admission, onward)      None          Current Medications[1]       History:   There are no hospital problems to display for this patient.    Surgical History:    has a past surgical history that includes right eye surgery (Right).   Social History:    reports that he is not currently sexually active.  reports that he has quit smoking. His smoking use included cigarettes. He has a 10.8 pack-year smoking history. He uses smokeless tobacco. He reports that he does not currently use drugs.     OBJECTIVE:     Vital Signs (Most Recent):    Vital Signs Range (Last 24H):          There is no height or weight on file to calculate BMI.   Wt Readings from Last 4 Encounters:   05/05/25 101 kg (222 lb 10.6 oz)   05/01/25 94.3 kg (208 lb)   04/02/25 96.6 kg (212 lb 15.4 oz)   02/17/23 96.8 kg (213 lb 6.5 oz)     Significant Labs:  Lab Results  "  Component Value Date    WBC 6.74 04/02/2025    HGB 14.2 04/02/2025    HCT 41.7 04/02/2025     04/02/2025     04/02/2025    K 4.7 04/02/2025     04/02/2025    CREATININE 1.2 04/02/2025    BUN 9 04/02/2025    CO2 24 04/02/2025    GLU 96 04/02/2025    CALCIUM 9.7 04/02/2025    ALKPHOS 75 04/07/2025    ALT 29 04/07/2025    AST 32 04/07/2025    ALBUMIN 4.2 04/07/2025    HGBA1C 5.2 04/02/2025     No LMP for male patient.  No results found for this or any previous visit (from the past 72 hours).    EKG:   No results found for this or any previous visit.    TTE:  No results found for this or any previous visit.  No results found for: "EF"   No results found for this or any previous visit.  ERIK:  No results found for this or any previous visit.  Stress Test:  No results found for this or any previous visit.     LHC:  No results found for this or any previous visit.     PFT:  No results found for: "FEV1", "FVC", "HPA3PVY", "TLC", "DLCO"   ASSESSMENT/PLAN:          Pre-op Assessment    I have reviewed the Patient Summary Reports.    I have reviewed the NPO Status.   I have reviewed the Medications.     Review of Systems  Anesthesia Hx:   History of prior surgery of interest to airway management or planning:             Hematology/Oncology:  Hematology Normal   Oncology Normal                                   EENT/Dental:  EENT/Dental Normal           Cardiovascular:     Hypertension                                          Pulmonary:  Pulmonary Normal                       Renal/:  Renal/ Normal                 Hepatic/GI:  Hepatic/GI Normal                    Musculoskeletal:  Musculoskeletal Normal                Neurological:  Neurology Normal                                      Endocrine:  Endocrine Normal            Dermatological:  Skin Normal    Psych:  Psychiatric Normal                    Physical Exam  General: Well nourished    Airway:  Mallampati: II   Mouth Opening: Normal  TM Distance: " Normal  Tongue: Normal  Neck ROM: Normal ROM    Dental:  Intact        Anesthesia Plan  Type of Anesthesia, risks & benefits discussed:    Anesthesia Type: Gen ETT, Gen Natural Airway  Intra-op Monitoring Plan: Standard ASA Monitors  Post Op Pain Control Plan: multimodal analgesia  Induction:  IV  ASA Score: 2  Day of Surgery Review of History & Physical: H&P Update referred to the surgeon/provider.I have interviewed and examined the patient. I have reviewed the patient's H&P dated: There are no significant changes.     Ready For Surgery From Anesthesia Perspective.     .           [1]   No current facility-administered medications for this encounter.     Current Outpatient Medications   Medication Sig Dispense Refill    aspirin (ECOTRIN) 81 MG EC tablet Take 1 tablet (81 mg total) by mouth once daily. 90 tablet 3    cephALEXin (KEFLEX) 500 MG capsule Take 1 capsule (500 mg total) by mouth every 12 (twelve) hours. 14 capsule 0    ketoconazole (NIZORAL) 2 % shampoo Apply topically every 7 days. 120 mL 6    mupirocin (BACTROBAN) 2 % ointment Apply topically 3 (three) times daily. 22 g 0    rosuvastatin (CRESTOR) 20 MG tablet Take 1 tablet (20 mg total) by mouth every evening. 90 tablet 3    sildenafiL (VIAGRA) 25 MG tablet Take 1 tablet (25 mg total) by mouth daily as needed for Erectile Dysfunction. 30 tablet 1    triamcinolone acetonide 0.1% (KENALOG) 0.1 % cream Use bid 454 g 3    valsartan (DIOVAN) 320 MG tablet Take 1 tablet (320 mg total) by mouth once daily. 90 tablet 3

## 2025-05-19 NOTE — TELEPHONE ENCOUNTER
Patient is scheduled for a Colonoscopy on 5/28/25 with Dr. stone  Referral for procedure from PAT [id number]

## 2025-05-20 ENCOUNTER — ANESTHESIA (OUTPATIENT)
Dept: ENDOSCOPY | Facility: HOSPITAL | Age: 65
End: 2025-05-20
Payer: MEDICARE

## 2025-05-28 ENCOUNTER — HOSPITAL ENCOUNTER (OUTPATIENT)
Facility: HOSPITAL | Age: 65
Discharge: HOME | End: 2025-05-28
Attending: INTERNAL MEDICINE | Admitting: INTERNAL MEDICINE
Payer: MEDICARE

## 2025-05-28 ENCOUNTER — TELEPHONE (OUTPATIENT)
Dept: SMOKING CESSATION | Facility: CLINIC | Age: 65
End: 2025-05-28
Payer: MEDICARE

## 2025-05-28 VITALS
TEMPERATURE: 98 F | RESPIRATION RATE: 20 BRPM | SYSTOLIC BLOOD PRESSURE: 120 MMHG | DIASTOLIC BLOOD PRESSURE: 67 MMHG | HEART RATE: 73 BPM | OXYGEN SATURATION: 99 % | BODY MASS INDEX: 28.23 KG/M2 | HEIGHT: 74 IN | WEIGHT: 220 LBS

## 2025-05-28 DIAGNOSIS — Z12.11 ENCOUNTER FOR SCREENING COLONOSCOPY: ICD-10-CM

## 2025-05-28 PROCEDURE — 25000003 PHARM REV CODE 250: Performed by: NURSE ANESTHETIST, CERTIFIED REGISTERED

## 2025-05-28 PROCEDURE — 37000009 HC ANESTHESIA EA ADD 15 MINS: Performed by: INTERNAL MEDICINE

## 2025-05-28 PROCEDURE — 63600175 PHARM REV CODE 636 W HCPCS: Performed by: NURSE ANESTHETIST, CERTIFIED REGISTERED

## 2025-05-28 PROCEDURE — 94761 N-INVAS EAR/PLS OXIMETRY MLT: CPT

## 2025-05-28 PROCEDURE — G0121 COLON CA SCRN NOT HI RSK IND: HCPCS | Mod: ,,, | Performed by: INTERNAL MEDICINE

## 2025-05-28 PROCEDURE — 37000008 HC ANESTHESIA 1ST 15 MINUTES: Performed by: INTERNAL MEDICINE

## 2025-05-28 PROCEDURE — 99900035 HC TECH TIME PER 15 MIN (STAT)

## 2025-05-28 PROCEDURE — G0121 COLON CA SCRN NOT HI RSK IND: HCPCS | Performed by: INTERNAL MEDICINE

## 2025-05-28 RX ORDER — SODIUM CHLORIDE 9 MG/ML
INJECTION, SOLUTION INTRAVENOUS CONTINUOUS
Status: DISCONTINUED | OUTPATIENT
Start: 2025-05-28 | End: 2025-05-28 | Stop reason: HOSPADM

## 2025-05-28 RX ORDER — PROPOFOL 10 MG/ML
VIAL (ML) INTRAVENOUS CONTINUOUS PRN
Status: DISCONTINUED | OUTPATIENT
Start: 2025-05-28 | End: 2025-05-28

## 2025-05-28 RX ORDER — PROPOFOL 10 MG/ML
VIAL (ML) INTRAVENOUS
Status: DISCONTINUED | OUTPATIENT
Start: 2025-05-28 | End: 2025-05-28

## 2025-05-28 RX ORDER — LIDOCAINE HYDROCHLORIDE 20 MG/ML
INJECTION INTRAVENOUS
Status: DISCONTINUED | OUTPATIENT
Start: 2025-05-28 | End: 2025-05-28

## 2025-05-28 RX ADMIN — PROPOFOL 150 MCG/KG/MIN: 10 INJECTION, EMULSION INTRAVENOUS at 10:05

## 2025-05-28 RX ADMIN — PROPOFOL 100 MG: 10 INJECTION, EMULSION INTRAVENOUS at 10:05

## 2025-05-28 RX ADMIN — SODIUM CHLORIDE: 0.9 INJECTION, SOLUTION INTRAVENOUS at 10:05

## 2025-05-28 RX ADMIN — LIDOCAINE HYDROCHLORIDE 100 MG: 20 INJECTION INTRAVENOUS at 10:05

## 2025-05-28 NOTE — TELEPHONE ENCOUNTER
Called to remind patient about appointment for June 02, 2025 at 9 AM.  No answer.  Left message and contact number was given.

## 2025-05-28 NOTE — TRANSFER OF CARE
"Anesthesia Transfer of Care Note    Patient: Gilson Tan    Procedure(s) Performed: Procedure(s) (LRB):  COLONOSCOPY, SCREENING, LOW RISK PATIENT (N/A)    Patient location: PACU    Anesthesia Type: general    Transport from OR: Transported from OR on room air with adequate spontaneous ventilation    Post pain: adequate analgesia    Post assessment: no apparent anesthetic complications and tolerated procedure well    Post vital signs: stable    Level of consciousness: alert and awake    Nausea/Vomiting: no nausea/vomiting    Complications: none    Transfer of care protocol was followed      Last vitals: Visit Vitals  /71 (BP Location: Left arm, Patient Position: Lying)   Pulse 72   Temp 36.4 °C (97.5 °F) (Temporal)   Resp 16   Ht 6' 2" (1.88 m)   Wt 99.8 kg (220 lb)   SpO2 (!) 93%   BMI 28.25 kg/m²     "

## 2025-05-29 NOTE — ANESTHESIA POSTPROCEDURE EVALUATION
Anesthesia Post Evaluation    Patient: Gilson Tan    Procedure(s) Performed: Procedure(s) (LRB):  COLONOSCOPY, SCREENING, LOW RISK PATIENT (N/A)    Final Anesthesia Type: general      Patient location during evaluation: GI PACU  Patient participation: Yes- Able to Participate  Level of consciousness: awake and alert and oriented  Post-procedure vital signs: reviewed and stable  Pain management: adequate  Airway patency: patent    PONV status at discharge: No PONV  Anesthetic complications: no      Cardiovascular status: hemodynamically stable  Respiratory status: spontaneous ventilation and unassisted  Hydration status: euvolemic  Follow-up not needed.              Vitals Value Taken Time   /67 05/28/25 11:30   Temp 36.4 °C (97.5 °F) 05/28/25 11:11   Pulse 73 05/28/25 11:30   Resp 20 05/28/25 11:30   SpO2 99 % 05/28/25 11:30         Event Time   Out of Recovery 11:30:00         Pain/Cielo Score: Cielo Score: 10 (5/28/2025 11:30 AM)

## 2025-06-02 ENCOUNTER — CLINICAL SUPPORT (OUTPATIENT)
Dept: SMOKING CESSATION | Facility: CLINIC | Age: 65
End: 2025-06-02
Payer: MEDICAID

## 2025-06-02 DIAGNOSIS — F17.200 NICOTINE DEPENDENCE: Primary | ICD-10-CM

## 2025-06-02 PROCEDURE — 99401 PREV MED CNSL INDIV APPRX 15: CPT | Mod: S$GLB,,, | Performed by: FAMILY MEDICINE

## 2025-07-18 ENCOUNTER — TELEPHONE (OUTPATIENT)
Dept: PRIMARY CARE CLINIC | Facility: CLINIC | Age: 65
End: 2025-07-18
Payer: MEDICARE

## 2025-07-18 NOTE — TELEPHONE ENCOUNTER
Copied from CRM #[id number]. Topic: Medications - Medication Refill  >> Jul 18, 2025  9:43 AM Karla wrote:  Requesting an RX refill or new RX.    Is this a refill or new RX:     RX name and strength   aspirin (ECOTRIN) 81 MG EC tablet      Is this a 30 day or 90 day RX:     Pharmacy name and phone #  [id number] - [place], [place] - [address] AT [facility] [address]  [address]  [facility] [address]  Phone: [phone] Fax: [phone]        Who called and call back number:[phone] [facility]